# Patient Record
Sex: FEMALE | Race: ASIAN | Employment: FULL TIME | URBAN - METROPOLITAN AREA
[De-identification: names, ages, dates, MRNs, and addresses within clinical notes are randomized per-mention and may not be internally consistent; named-entity substitution may affect disease eponyms.]

---

## 2017-01-09 ENCOUNTER — OFFICE VISIT (OUTPATIENT)
Dept: INTERNAL MEDICINE CLINIC | Age: 37
End: 2017-01-09

## 2017-01-09 VITALS
HEART RATE: 70 BPM | WEIGHT: 119.2 LBS | HEIGHT: 60 IN | BODY MASS INDEX: 23.4 KG/M2 | OXYGEN SATURATION: 99 % | DIASTOLIC BLOOD PRESSURE: 60 MMHG | SYSTOLIC BLOOD PRESSURE: 100 MMHG

## 2017-01-09 DIAGNOSIS — B00.2 RECURRENT ORAL HERPES SIMPLEX: ICD-10-CM

## 2017-01-09 DIAGNOSIS — J02.9 SORE THROAT: Primary | ICD-10-CM

## 2017-01-09 PROCEDURE — 99213 OFFICE O/P EST LOW 20 MIN: CPT | Performed by: INTERNAL MEDICINE

## 2017-01-09 RX ORDER — VALACYCLOVIR HYDROCHLORIDE 1 G/1
1000 TABLET, FILM COATED ORAL 3 TIMES DAILY
Qty: 21 TABLET | Refills: 0 | Status: SHIPPED | OUTPATIENT
Start: 2017-01-09 | End: 2017-01-16

## 2017-01-09 RX ORDER — AMOXICILLIN AND CLAVULANATE POTASSIUM 875; 125 MG/1; MG/1
1 TABLET, FILM COATED ORAL 2 TIMES DAILY
Qty: 14 TABLET | Refills: 0 | Status: SHIPPED | OUTPATIENT
Start: 2017-01-09 | End: 2017-01-16

## 2017-01-09 ASSESSMENT — ENCOUNTER SYMPTOMS
CONSTIPATION: 0
ABDOMINAL PAIN: 1
BLURRED VISION: 1
PHOTOPHOBIA: 0
DOUBLE VISION: 1
HEMOPTYSIS: 0
NAUSEA: 0
COUGH: 1
STRIDOR: 0
SORE THROAT: 0
DIARRHEA: 0
EYE DISCHARGE: 0
WHEEZING: 0
SHORTNESS OF BREATH: 1
HEARTBURN: 0
VOMITING: 0
BLOOD IN STOOL: 0
EYE PAIN: 0
ORTHOPNEA: 0
BACK PAIN: 0
SPUTUM PRODUCTION: 0
EYE REDNESS: 0

## 2017-05-15 ENCOUNTER — OFFICE VISIT (OUTPATIENT)
Dept: INTERNAL MEDICINE CLINIC | Age: 37
End: 2017-05-15

## 2017-05-15 VITALS
HEIGHT: 60 IN | HEART RATE: 61 BPM | BODY MASS INDEX: 23.99 KG/M2 | SYSTOLIC BLOOD PRESSURE: 100 MMHG | DIASTOLIC BLOOD PRESSURE: 60 MMHG | OXYGEN SATURATION: 98 % | WEIGHT: 122.2 LBS

## 2017-05-15 DIAGNOSIS — G43.409 HEMIPLEGIC MIGRAINE WITHOUT STATUS MIGRAINOSUS, NOT INTRACTABLE: ICD-10-CM

## 2017-05-15 DIAGNOSIS — M62.830 SPASM OF THORACIC BACK MUSCLE: ICD-10-CM

## 2017-05-15 DIAGNOSIS — M62.830 SPASM OF THORACIC BACK MUSCLE: Primary | ICD-10-CM

## 2017-05-15 DIAGNOSIS — B00.2 RECURRENT ORAL HERPES SIMPLEX: ICD-10-CM

## 2017-05-15 DIAGNOSIS — R10.13 EPIGASTRIC PAIN: ICD-10-CM

## 2017-05-15 DIAGNOSIS — D64.9 ANEMIA, UNSPECIFIED TYPE: ICD-10-CM

## 2017-05-15 DIAGNOSIS — E55.9 VITAMIN D DEFICIENCY: Primary | ICD-10-CM

## 2017-05-15 LAB
A/G RATIO: 2.4 (ref 1.1–2.2)
ALBUMIN SERPL-MCNC: 4.6 G/DL (ref 3.4–5)
ALP BLD-CCNC: 38 U/L (ref 40–129)
ALT SERPL-CCNC: 21 U/L (ref 10–40)
ANION GAP SERPL CALCULATED.3IONS-SCNC: 15 MMOL/L (ref 3–16)
AST SERPL-CCNC: 18 U/L (ref 15–37)
BASOPHILS ABSOLUTE: 0.1 K/UL (ref 0–0.2)
BASOPHILS RELATIVE PERCENT: 0.7 %
BILIRUB SERPL-MCNC: 0.8 MG/DL (ref 0–1)
BUN BLDV-MCNC: 14 MG/DL (ref 7–20)
CALCIUM SERPL-MCNC: 8.8 MG/DL (ref 8.3–10.6)
CHLORIDE BLD-SCNC: 104 MMOL/L (ref 99–110)
CO2: 23 MMOL/L (ref 21–32)
CREAT SERPL-MCNC: <0.5 MG/DL (ref 0.6–1.1)
EOSINOPHILS ABSOLUTE: 0.1 K/UL (ref 0–0.6)
EOSINOPHILS RELATIVE PERCENT: 1.2 %
FOLATE: 6.84 NG/ML (ref 4.78–24.2)
GFR AFRICAN AMERICAN: >60
GFR NON-AFRICAN AMERICAN: >60
GLOBULIN: 1.9 G/DL
GLUCOSE BLD-MCNC: 88 MG/DL (ref 70–99)
HCT VFR BLD CALC: 36.8 % (ref 36–48)
HEMOGLOBIN: 11.2 G/DL (ref 12–16)
LYMPHOCYTES ABSOLUTE: 1.7 K/UL (ref 1–5.1)
LYMPHOCYTES RELATIVE PERCENT: 22 %
MCH RBC QN AUTO: 18.6 PG (ref 26–34)
MCHC RBC AUTO-ENTMCNC: 30.3 G/DL (ref 31–36)
MCV RBC AUTO: 61.4 FL (ref 80–100)
MICROCYTES: ABNORMAL
MONOCYTES ABSOLUTE: 0.3 K/UL (ref 0–1.3)
MONOCYTES RELATIVE PERCENT: 4.4 %
NEUTROPHILS ABSOLUTE: 5.7 K/UL (ref 1.7–7.7)
NEUTROPHILS RELATIVE PERCENT: 71.7 %
OVALOCYTES: ABNORMAL
PDW BLD-RTO: 16.5 % (ref 12.4–15.4)
PLATELET # BLD: 291 K/UL (ref 135–450)
PLATELET SLIDE REVIEW: ADEQUATE
PMV BLD AUTO: 12 FL (ref 5–10.5)
POTASSIUM SERPL-SCNC: 4.4 MMOL/L (ref 3.5–5.1)
RBC # BLD: 6.01 M/UL (ref 4–5.2)
SODIUM BLD-SCNC: 142 MMOL/L (ref 136–145)
TEAR DROP CELLS: ABNORMAL
TOTAL CK: 115 U/L (ref 26–192)
TOTAL PROTEIN: 6.5 G/DL (ref 6.4–8.2)
VITAMIN B-12: 530 PG/ML (ref 211–911)
VITAMIN D 25-HYDROXY: 14.9 NG/ML
WBC # BLD: 7.9 K/UL (ref 4–11)

## 2017-05-15 PROCEDURE — 99214 OFFICE O/P EST MOD 30 MIN: CPT | Performed by: INTERNAL MEDICINE

## 2017-05-15 RX ORDER — TIZANIDINE 2 MG/1
2 TABLET ORAL EVERY 6 HOURS PRN
Qty: 60 TABLET | Refills: 3 | Status: SHIPPED | OUTPATIENT
Start: 2017-05-15 | End: 2017-08-25

## 2017-05-15 RX ORDER — RANITIDINE 150 MG/1
150 CAPSULE ORAL 2 TIMES DAILY
Qty: 60 CAPSULE | Refills: 3 | Status: SHIPPED | OUTPATIENT
Start: 2017-05-15 | End: 2017-10-21 | Stop reason: SDUPTHER

## 2017-05-15 ASSESSMENT — ENCOUNTER SYMPTOMS
ABDOMINAL PAIN: 0
WHEEZING: 0
BACK PAIN: 1
DOUBLE VISION: 0
EYE PAIN: 0
SPUTUM PRODUCTION: 0
BLURRED VISION: 0
ORTHOPNEA: 0
EYE DISCHARGE: 0
DIARRHEA: 0
EYE REDNESS: 0
HEARTBURN: 0
PHOTOPHOBIA: 0
COUGH: 0
CONSTIPATION: 0
SHORTNESS OF BREATH: 0
STRIDOR: 0
HEMOPTYSIS: 0
SORE THROAT: 0
VOMITING: 0
BLOOD IN STOOL: 0
NAUSEA: 0

## 2017-05-16 LAB
ANA INTERPRETATION: NORMAL
ANTI-NUCLEAR ANTIBODY (ANA): NEGATIVE
ESTIMATED AVERAGE GLUCOSE: 102.5 MG/DL
HBA1C MFR BLD: 5.2 %

## 2017-05-17 PROBLEM — E55.9 VITAMIN D DEFICIENCY: Status: ACTIVE | Noted: 2017-05-17

## 2017-05-17 LAB — H PYLORI BREATH TEST: NEGATIVE

## 2017-05-22 ENCOUNTER — HOSPITAL ENCOUNTER (OUTPATIENT)
Dept: MRI IMAGING | Age: 37
Discharge: HOME | End: 2017-05-22
Attending: INTERNAL MEDICINE | Admitting: INTERNAL MEDICINE

## 2017-05-22 DIAGNOSIS — R10.13 EPIGASTRIC PAIN: ICD-10-CM

## 2017-05-22 DIAGNOSIS — M62.830 MUSCLE SPASM OF BACK: ICD-10-CM

## 2017-05-22 DIAGNOSIS — B00.2 RECURRENT ORAL HERPES SIMPLEX: ICD-10-CM

## 2017-05-22 DIAGNOSIS — M62.830 SPASM OF THORACIC BACK MUSCLE: ICD-10-CM

## 2017-06-27 PROBLEM — G43.019 INTRACTABLE MIGRAINE WITHOUT AURA AND WITHOUT STATUS MIGRAINOSUS: Status: ACTIVE | Noted: 2017-06-27

## 2017-07-07 ENCOUNTER — HOSPITAL ENCOUNTER (OUTPATIENT)
Dept: MRI IMAGING | Age: 37
Discharge: HOME | End: 2017-07-07
Attending: PSYCHIATRY & NEUROLOGY | Admitting: PSYCHIATRY & NEUROLOGY

## 2017-07-07 DIAGNOSIS — G43.019 INTRACTABLE MIGRAINE WITHOUT AURA AND WITHOUT STATUS MIGRAINOSUS: ICD-10-CM

## 2017-08-25 ENCOUNTER — OFFICE VISIT (OUTPATIENT)
Dept: NEUROLOGY | Age: 37
End: 2017-08-25

## 2017-08-25 VITALS
SYSTOLIC BLOOD PRESSURE: 106 MMHG | BODY MASS INDEX: 23.75 KG/M2 | HEART RATE: 63 BPM | HEIGHT: 60 IN | WEIGHT: 121 LBS | OXYGEN SATURATION: 98 % | DIASTOLIC BLOOD PRESSURE: 64 MMHG

## 2017-08-25 DIAGNOSIS — M62.830 SPASM OF THORACIC BACK MUSCLE: ICD-10-CM

## 2017-08-25 DIAGNOSIS — E55.9 VITAMIN D DEFICIENCY: ICD-10-CM

## 2017-08-25 DIAGNOSIS — G43.019 INTRACTABLE MIGRAINE WITHOUT AURA AND WITHOUT STATUS MIGRAINOSUS: Primary | ICD-10-CM

## 2017-08-25 PROCEDURE — 99213 OFFICE O/P EST LOW 20 MIN: CPT | Performed by: PSYCHIATRY & NEUROLOGY

## 2017-08-25 RX ORDER — CHLORZOXAZONE 500 MG/1
500 TABLET ORAL 4 TIMES DAILY PRN
Qty: 120 TABLET | Refills: 11 | Status: SHIPPED | OUTPATIENT
Start: 2017-08-25 | End: 2017-08-25 | Stop reason: SDUPTHER

## 2017-08-25 RX ORDER — CHLORZOXAZONE 500 MG/1
500 TABLET ORAL 4 TIMES DAILY PRN
Qty: 120 TABLET | Refills: 11 | Status: SHIPPED | OUTPATIENT
Start: 2017-08-25 | End: 2018-02-19 | Stop reason: SDUPTHER

## 2017-09-22 DIAGNOSIS — G43.019 INTRACTABLE MIGRAINE WITHOUT AURA AND WITHOUT STATUS MIGRAINOSUS: ICD-10-CM

## 2017-09-22 DIAGNOSIS — M62.830 SPASM OF THORACIC BACK MUSCLE: ICD-10-CM

## 2017-10-21 DIAGNOSIS — R10.13 EPIGASTRIC PAIN: ICD-10-CM

## 2017-10-23 ENCOUNTER — OFFICE VISIT (OUTPATIENT)
Dept: INTERNAL MEDICINE CLINIC | Age: 37
End: 2017-10-23

## 2017-10-23 VITALS
WEIGHT: 121 LBS | HEART RATE: 61 BPM | DIASTOLIC BLOOD PRESSURE: 66 MMHG | OXYGEN SATURATION: 98 % | BODY MASS INDEX: 23.63 KG/M2 | SYSTOLIC BLOOD PRESSURE: 102 MMHG

## 2017-10-23 DIAGNOSIS — L29.9 PRURITUS: Primary | ICD-10-CM

## 2017-10-23 DIAGNOSIS — Z23 NEED FOR INFLUENZA VACCINATION: ICD-10-CM

## 2017-10-23 DIAGNOSIS — G43.409 HEMIPLEGIC MIGRAINE WITHOUT STATUS MIGRAINOSUS, NOT INTRACTABLE: ICD-10-CM

## 2017-10-23 DIAGNOSIS — K21.9 GASTROESOPHAGEAL REFLUX DISEASE WITHOUT ESOPHAGITIS: ICD-10-CM

## 2017-10-23 PROCEDURE — 90471 IMMUNIZATION ADMIN: CPT | Performed by: INTERNAL MEDICINE

## 2017-10-23 PROCEDURE — 90686 IIV4 VACC NO PRSV 0.5 ML IM: CPT | Performed by: INTERNAL MEDICINE

## 2017-10-23 PROCEDURE — 99213 OFFICE O/P EST LOW 20 MIN: CPT | Performed by: INTERNAL MEDICINE

## 2017-10-23 RX ORDER — TRIAMCINOLONE ACETONIDE OINTMENT USP, 0.05% 0.5 MG/G
OINTMENT TOPICAL 2 TIMES DAILY PRN
Qty: 17 G | Refills: 2 | Status: SHIPPED | OUTPATIENT
Start: 2017-10-23 | End: 2019-12-30

## 2017-10-23 RX ORDER — RANITIDINE 150 MG/1
CAPSULE ORAL
Qty: 60 CAPSULE | Refills: 0 | Status: SHIPPED | OUTPATIENT
Start: 2017-10-23 | End: 2017-10-23

## 2017-10-23 RX ORDER — RANITIDINE 150 MG/1
150 TABLET ORAL 2 TIMES DAILY
Qty: 60 TABLET | Refills: 3 | Status: SHIPPED | OUTPATIENT
Start: 2017-10-23 | End: 2018-02-19 | Stop reason: SDUPTHER

## 2017-10-23 ASSESSMENT — ENCOUNTER SYMPTOMS
TROUBLE SWALLOWING: 0
DIARRHEA: 0
ABDOMINAL PAIN: 0
SHORTNESS OF BREATH: 0
VOMITING: 0
WHEEZING: 0
SORE THROAT: 0
NAUSEA: 0

## 2017-10-23 NOTE — PROGRESS NOTES
Vaccine Information Sheet, \"Influenza - Inactivated\"  given to Emmalene Perdomo, or parent/legal guardian of  Emmalene Perdomo and verbalized understanding. Patient responses:    Have you ever had a reaction to a flu vaccine? No  Are you able to eat eggs without adverse effects? Yes  Do you have any current illness? No  Have you ever had Guillian Lovingston Syndrome? No    Flu vaccine given per order. Please see immunization tab.

## 2017-10-23 NOTE — PROGRESS NOTES
History:   Procedure Laterality Date    CARPAL TUNNEL RELEASE  2010     SECTION  ,,       Orders Only on 05/15/2017   Component Date Value Ref Range Status    WBC 05/15/2017 7.9  4.0 - 11.0 K/uL Final    RBC 05/15/2017 6.01* 4.00 - 5.20 M/uL Final    Hemoglobin 05/15/2017 11.2* 12.0 - 16.0 g/dL Final    Hematocrit 05/15/2017 36.8  36.0 - 48.0 % Final    MCV 05/15/2017 61.4* 80.0 - 100.0 fL Final    MCH 05/15/2017 18.6* 26.0 - 34.0 pg Final    MCHC 05/15/2017 30.3* 31.0 - 36.0 g/dL Final    RDW 05/15/2017 16.5* 12.4 - 15.4 % Final    Platelets  291  135 - 450 K/uL Final    MPV 05/15/2017 12.0* 5.0 - 10.5 fL Final    PLATELET SLIDE REVIEW 05/15/2017 Adequate   Final    Neutrophils % 05/15/2017 71.7  % Final    Lymphocytes % 05/15/2017 22.0  % Final    Monocytes % 05/15/2017 4.4  % Final    Eosinophils % 05/15/2017 1.2  % Final    Basophils % 05/15/2017 0.7  % Final    Neutrophils # 05/15/2017 5.7  1.7 - 7.7 K/uL Final    Lymphocytes # 05/15/2017 1.7  1.0 - 5.1 K/uL Final    Monocytes # 05/15/2017 0.3  0.0 - 1.3 K/uL Final    Eosinophils # 05/15/2017 0.1  0.0 - 0.6 K/uL Final    Basophils # 05/15/2017 0.1  0.0 - 0.2 K/uL Final    Microcytes 05/15/2017 1+*  Final    Ovalocytes 05/15/2017 Occasional*  Final    Tear Drop Cells 05/15/2017 Occasional*  Final    Sodium 05/15/2017 142  136 - 145 mmol/L Final    Potassium 05/15/2017 4.4  3.5 - 5.1 mmol/L Final    Chloride 05/15/2017 104  99 - 110 mmol/L Final    CO2 05/15/2017 23  21 - 32 mmol/L Final    Anion Gap 05/15/2017 15  3 - 16 Final    Glucose 05/15/2017 88  70 - 99 mg/dL Final    BUN 05/15/2017 14  7 - 20 mg/dL Final    CREATININE 05/15/2017 <0.5* 0.6 - 1.1 mg/dL Final    GFR Non- 05/15/2017 >60  >60 Final    Comment: >60 mL/min/1.73m2 EGFR, calc. for ages 25 and older using the  MDRD formula (not corrected for weight), is valid for stable  renal function.       GFR  Take 1 tablet by mouth 2 times daily 60 tablet 3    omeprazole (PRILOSEC) 40 MG capsule   5    acetaminophen (TYLENOL) 325 MG tablet Take 650 mg by mouth every 6 hours as needed for Pain      isometheptene-acetaminophen-dichloralphenazone (MIDRIN) -100 MG per capsule Take 1 capsule by mouth every 4 hours as needed for Migraine (Headache) 120 capsule 3    Diclofenac Sodium POWD Apply 1-2 g topically as needed (TID - QID) Formula 3 D Ketamine 5 % 240 g 3     No current facility-administered medications for this visit. No Known Allergies    Review of Systems   Constitutional: Negative for chills, fatigue and fever. HENT: Negative for ear pain, sore throat, tinnitus and trouble swallowing. Eyes: Negative for visual disturbance. Respiratory: Negative for shortness of breath and wheezing. Cardiovascular: Negative for chest pain and palpitations. Gastrointestinal: Negative for abdominal pain, diarrhea, nausea and vomiting. Endocrine: Negative for cold intolerance and heat intolerance. Genitourinary: Negative for difficulty urinating and dysuria. Skin: Negative for rash. Pruritic glans of [place]. Bilateral   Neurological: Negative for dizziness, weakness and numbness. Psychiatric/Behavioral: Negative for agitation, decreased concentration and suicidal ideas. The patient is not nervous/anxious. All other systems reviewed and are negative. Vitals:  /66 (Site: Right Arm, Cuff Size: Medium Adult)   Pulse 61   Wt 121 lb (54.9 kg)   SpO2 98%   BMI 23.63 kg/m²     Physical Exam   Constitutional: She is oriented to person, place, and time. She appears well-developed and well-nourished. No distress. HENT:   Head: Normocephalic and atraumatic. Right Ear: Hearing, tympanic membrane and external ear normal.   Left Ear: Hearing, tympanic membrane and external ear normal.   Eyes: Conjunctivae and lids are normal. Pupils are equal, round, and reactive to light.  No scleral icterus. Neck: Trachea normal and normal range of motion. No hepatojugular reflux and no JVD present. Carotid bruit is not present. No thyromegaly present. Cardiovascular: Normal rate, regular rhythm, normal heart sounds and intact distal pulses. Exam reveals no friction rub. No murmur heard. Pulmonary/Chest: Effort normal and breath sounds normal. No respiratory distress. Abdominal: Soft. Normal appearance and bowel sounds are normal. She exhibits no distension. There is no tenderness. Musculoskeletal: Normal range of motion. She exhibits no edema. Arms:  Lymphadenopathy:     She has no cervical adenopathy. Neurological: She is alert and oriented to person, place, and time. She has normal strength and normal reflexes. No cranial nerve deficit or sensory deficit. Skin: Skin is warm and dry. No rash noted. She is not diaphoretic. No cyanosis. Nails show no clubbing. Psychiatric: She has a normal mood and affect. Her speech is normal and behavior is normal.       Assessment/Plan     1. Pruritus  - triamcinolone (KENALOG) 0.05 % OINT ointment; Apply topically 2 times daily as needed (itch)  Dispense: 17 g; Refill: 2    2. Hemiplegic migraine without status migrainosus, not intractable  - Isometheptene-Dichloral-APAP (MIDRIN) -325 MG CAPS; Take 1 capsule by mouth every 4 hours as needed (headache)  Dispense: 120 capsule; Refill: 3    3. Need for influenza vaccination  - INFLUENZA, QUADV, 3 YRS AND OLDER, IM, PF, PREFILL SYR OR SDV, 0.5ML (FLUZONE QUADV, PF)    4. Gastroesophageal reflux disease without esophagitis  - ranitidine (ZANTAC) 150 MG tablet; Take 1 tablet by mouth 2 times daily  Dispense: 60 tablet; Refill: 3      Orders Placed This Encounter   Procedures    INFLUENZA, QUADV, 3 YRS AND OLDER, IM, PF, PREFILL SYR OR SDV, 0.5ML (FLUZONE QUADV, PF)       Return in about 2 weeks (around 11/6/2017).     Meg Murphy MD     10/23/2017  12:51 PM    Documentation was done using

## 2018-02-19 ENCOUNTER — OFFICE VISIT (OUTPATIENT)
Dept: INTERNAL MEDICINE CLINIC | Age: 38
End: 2018-02-19

## 2018-02-19 VITALS
OXYGEN SATURATION: 99 % | BODY MASS INDEX: 23.95 KG/M2 | RESPIRATION RATE: 16 BRPM | HEIGHT: 60 IN | SYSTOLIC BLOOD PRESSURE: 108 MMHG | WEIGHT: 122 LBS | HEART RATE: 62 BPM | DIASTOLIC BLOOD PRESSURE: 68 MMHG | TEMPERATURE: 98.2 F

## 2018-02-19 DIAGNOSIS — G43.019 INTRACTABLE MIGRAINE WITHOUT AURA AND WITHOUT STATUS MIGRAINOSUS: ICD-10-CM

## 2018-02-19 DIAGNOSIS — K21.9 GASTROESOPHAGEAL REFLUX DISEASE WITHOUT ESOPHAGITIS: ICD-10-CM

## 2018-02-19 DIAGNOSIS — M19.90 ARTHRITIS: Primary | ICD-10-CM

## 2018-02-19 DIAGNOSIS — B00.2 RECURRENT ORAL HERPES SIMPLEX: ICD-10-CM

## 2018-02-19 DIAGNOSIS — G43.409 HEMIPLEGIC MIGRAINE WITHOUT STATUS MIGRAINOSUS, NOT INTRACTABLE: ICD-10-CM

## 2018-02-19 DIAGNOSIS — E55.9 VITAMIN D DEFICIENCY: ICD-10-CM

## 2018-02-19 DIAGNOSIS — M62.830 SPASM OF THORACIC BACK MUSCLE: ICD-10-CM

## 2018-02-19 LAB
A/G RATIO: 2.3 (ref 1.1–2.2)
ALBUMIN SERPL-MCNC: 4.8 G/DL (ref 3.4–5)
ALP BLD-CCNC: 39 U/L (ref 40–129)
ALT SERPL-CCNC: 17 U/L (ref 10–40)
ANION GAP SERPL CALCULATED.3IONS-SCNC: 13 MMOL/L (ref 3–16)
AST SERPL-CCNC: 15 U/L (ref 15–37)
BASOPHILS ABSOLUTE: 0.1 K/UL (ref 0–0.2)
BASOPHILS RELATIVE PERCENT: 0.6 %
BILIRUB SERPL-MCNC: 0.9 MG/DL (ref 0–1)
BUN BLDV-MCNC: 14 MG/DL (ref 7–20)
CALCIUM SERPL-MCNC: 9.2 MG/DL (ref 8.3–10.6)
CHLORIDE BLD-SCNC: 103 MMOL/L (ref 99–110)
CHOLESTEROL, TOTAL: 142 MG/DL (ref 0–199)
CO2: 25 MMOL/L (ref 21–32)
CREAT SERPL-MCNC: <0.5 MG/DL (ref 0.6–1.1)
EOSINOPHILS ABSOLUTE: 0.3 K/UL (ref 0–0.6)
EOSINOPHILS RELATIVE PERCENT: 3.3 %
GFR AFRICAN AMERICAN: >60
GFR NON-AFRICAN AMERICAN: >60
GLOBULIN: 2.1 G/DL
GLUCOSE BLD-MCNC: 80 MG/DL (ref 70–99)
HCT VFR BLD CALC: 34.3 % (ref 36–48)
HDLC SERPL-MCNC: 39 MG/DL (ref 40–60)
HEMATOLOGY PATH CONSULT: NO
HEMOGLOBIN: 11.1 G/DL (ref 12–16)
LDL CHOLESTEROL CALCULATED: 89 MG/DL
LYMPHOCYTES ABSOLUTE: 1.9 K/UL (ref 1–5.1)
LYMPHOCYTES RELATIVE PERCENT: 20.6 %
MAGNESIUM: 2.2 MG/DL (ref 1.8–2.4)
MCH RBC QN AUTO: 19.5 PG (ref 26–34)
MCHC RBC AUTO-ENTMCNC: 32.5 G/DL (ref 31–36)
MCV RBC AUTO: 59.9 FL (ref 80–100)
MONOCYTES ABSOLUTE: 0.4 K/UL (ref 0–1.3)
MONOCYTES RELATIVE PERCENT: 4.8 %
NEUTROPHILS ABSOLUTE: 6.4 K/UL (ref 1.7–7.7)
NEUTROPHILS RELATIVE PERCENT: 70.7 %
PDW BLD-RTO: 15.7 % (ref 12.4–15.4)
PLATELET # BLD: 267 K/UL (ref 135–450)
PMV BLD AUTO: 11.8 FL (ref 5–10.5)
POTASSIUM SERPL-SCNC: 4 MMOL/L (ref 3.5–5.1)
RBC # BLD: 5.73 M/UL (ref 4–5.2)
SODIUM BLD-SCNC: 141 MMOL/L (ref 136–145)
TOTAL PROTEIN: 6.9 G/DL (ref 6.4–8.2)
TRIGL SERPL-MCNC: 72 MG/DL (ref 0–150)
VLDLC SERPL CALC-MCNC: 14 MG/DL
WBC # BLD: 9.1 K/UL (ref 4–11)

## 2018-02-19 PROCEDURE — 99214 OFFICE O/P EST MOD 30 MIN: CPT | Performed by: INTERNAL MEDICINE

## 2018-02-19 RX ORDER — MULTIVIT-MIN/IRON/FOLIC/HRB186 3.3 MG-25
1 TABLET ORAL DAILY
Qty: 30 CAPSULE | Refills: 11 | Status: SHIPPED | OUTPATIENT
Start: 2018-02-19 | End: 2019-01-07

## 2018-02-19 RX ORDER — ACYCLOVIR 400 MG/1
400 TABLET ORAL 2 TIMES DAILY
Qty: 60 TABLET | Refills: 3 | Status: SHIPPED | OUTPATIENT
Start: 2018-02-19 | End: 2018-09-03 | Stop reason: SDUPTHER

## 2018-02-19 RX ORDER — CHLORZOXAZONE 500 MG/1
500 TABLET ORAL 4 TIMES DAILY PRN
Qty: 120 TABLET | Refills: 11 | Status: SHIPPED | OUTPATIENT
Start: 2018-02-19 | End: 2019-01-07 | Stop reason: SDUPTHER

## 2018-02-19 RX ORDER — RANITIDINE 150 MG/1
150 TABLET ORAL 2 TIMES DAILY
Qty: 60 TABLET | Refills: 3 | Status: SHIPPED | OUTPATIENT
Start: 2018-02-19 | End: 2019-01-07 | Stop reason: ALTCHOICE

## 2018-02-19 ASSESSMENT — ENCOUNTER SYMPTOMS
VOMITING: 0
HEMOPTYSIS: 0
WHEEZING: 0
HEARTBURN: 0
STRIDOR: 0
EYE REDNESS: 0
DIARRHEA: 0
ABDOMINAL PAIN: 0
ORTHOPNEA: 0
SORE THROAT: 0
CONSTIPATION: 0
EYE DISCHARGE: 0
PHOTOPHOBIA: 0
NAUSEA: 0
SPUTUM PRODUCTION: 0
BLOOD IN STOOL: 0
DOUBLE VISION: 0
BLURRED VISION: 0
SHORTNESS OF BREATH: 0
COUGH: 0
BACK PAIN: 1
EYE PAIN: 0

## 2018-02-19 NOTE — PATIENT INSTRUCTIONS
temperature away from moisture and heat. What happens if I miss a dose? Skip the missed dose if it is almost time for your next scheduled dose. Do not use extra chondroitin and glucosamine to make up the missed dose. What happens if I overdose? Seek emergency medical attention or call the Poison Help line at 1-329.573.5331. What should I avoid while taking chondroitin and glucosamine? Follow your healthcare provider's instructions about any restrictions on food, beverages, or activity. What are the possible side effects of chondroitin and glucosamine? Get emergency medical help if you have signs of an allergic reaction: hives; difficult breathing; swelling of your face, lips, tongue, or throat. Although not all side effects are known, chondroitin and glucosamine is thought to be possibly safe when taken for up to 2 years. Stop using chondroitin and glucosamine and call your healthcare provider at once if you have:  · irregular heartbeats; or  · swelling in your legs. Common side effects may include:  · nausea, diarrhea, constipation;  · stomach pain, gas, bloating;  · hair loss; or  · puffy eyelids. This is not a complete list of side effects and others may occur. Call your doctor for medical advice about side effects. You may report side effects to FDA at 5-708-AFA-6200. What other drugs will affect chondroitin and glucosamine? Do not take chondroitin and glucosamine without medical advice if you are using any of the following medications:  · cancer medicine (chemotherapy). This list is not complete. Other drugs may interact with chondroitin and glucosamine, including prescription and over-the-counter medicines, vitamins, and herbal products. Not all possible interactions are listed in this product guide. Where can I get more information? Consult with a licensed healthcare professional before using any herbal/health supplement.  Whether you are treated by a medical doctor or a practitioner trained questions about a medical condition or this instruction, always ask your healthcare professional. [place] [address] any warranty or liability for your use of this information.

## 2018-02-19 NOTE — PROGRESS NOTES
and urgency. Musculoskeletal: Positive for back pain. Negative for falls, joint pain, myalgias and neck pain. Skin: Negative. Negative for itching and rash. Neurological: Positive for weakness. Negative for dizziness, tingling, tremors, sensory change, speech change, focal weakness, seizures, loss of consciousness and headaches. Endo/Heme/Allergies: Negative. Psychiatric/Behavioral: Negative. Negative for depression, hallucinations, memory loss, substance abuse and suicidal ideas. The patient is not nervous/anxious and does not have insomnia. All other systems reviewed and are negative. Objective: Wt Readings from Last 3 Encounters:   02/19/18 122 lb (55.3 kg)   10/23/17 121 lb (54.9 kg)   08/25/17 121 lb (54.9 kg)     BP Readings from Last 3 Encounters:   02/19/18 108/68   10/23/17 102/66   08/25/17 106/64      Vitals:    02/19/18 1417   BP: 108/68   Site: Left Arm   Position: Sitting   Cuff Size: Medium Adult   Pulse: 62   Resp: 16   Temp: 98.2 °F (36.8 °C)   TempSrc: Oral   SpO2: 99%   Weight: 122 lb (55.3 kg)   Height: 5' (1.524 m)     Body mass index is 23.83 kg/m². Physical Exam   Constitutional: She is oriented to person, place, and time and well-developed, well-nourished, and in no distress. Vital signs are normal. She appears not lethargic, to not be writhing in pain, not malnourished, not dehydrated and not jaundiced. She appears healthy. She appears not cachectic. Non-toxic appearance. She does not have a sickly appearance. No distress. HENT:   Head: Normocephalic and atraumatic. Right Ear: Hearing, tympanic membrane, external ear and ear canal normal.   Left Ear: Hearing, tympanic membrane, external ear and ear canal normal.   Nose: Nose normal.   Mouth/Throat: Oropharynx is clear and moist. No oropharyngeal exudate. Eyes: Right eye visual fields normal and left eye visual fields normal. Conjunctivae and EOM are normal. Pupils are equal, round, and reactive to light.

## 2018-02-20 LAB
TSH SERPL DL<=0.05 MIU/L-ACNC: 0.87 UIU/ML (ref 0.27–4.2)
VITAMIN D 25-HYDROXY: 44.2 NG/ML

## 2018-03-05 ENCOUNTER — HOSPITAL ENCOUNTER (OUTPATIENT)
Dept: WOMENS IMAGING | Age: 38
Discharge: HOME | End: 2018-03-05
Attending: INTERNAL MEDICINE | Admitting: INTERNAL MEDICINE

## 2018-03-05 DIAGNOSIS — Z12.31 VISIT FOR SCREENING MAMMOGRAM: ICD-10-CM

## 2018-06-04 ENCOUNTER — OFFICE VISIT (OUTPATIENT)
Dept: INTERNAL MEDICINE CLINIC | Age: 38
End: 2018-06-04

## 2018-06-04 VITALS
HEART RATE: 74 BPM | HEIGHT: 60 IN | BODY MASS INDEX: 23.75 KG/M2 | DIASTOLIC BLOOD PRESSURE: 62 MMHG | OXYGEN SATURATION: 99 % | SYSTOLIC BLOOD PRESSURE: 102 MMHG | WEIGHT: 121 LBS

## 2018-06-04 DIAGNOSIS — Z71.84 TRAVEL ADVICE ENCOUNTER: ICD-10-CM

## 2018-06-04 DIAGNOSIS — G43.409 HEMIPLEGIC MIGRAINE WITHOUT STATUS MIGRAINOSUS, NOT INTRACTABLE: Primary | ICD-10-CM

## 2018-06-04 DIAGNOSIS — D64.9 ANEMIA, UNSPECIFIED TYPE: ICD-10-CM

## 2018-06-04 DIAGNOSIS — E55.9 VITAMIN D DEFICIENCY: ICD-10-CM

## 2018-06-04 DIAGNOSIS — R10.13 EPIGASTRIC PAIN: ICD-10-CM

## 2018-06-04 PROCEDURE — 99214 OFFICE O/P EST MOD 30 MIN: CPT | Performed by: INTERNAL MEDICINE

## 2018-06-04 RX ORDER — OMEPRAZOLE 40 MG/1
40 CAPSULE, DELAYED RELEASE ORAL DAILY
Qty: 30 CAPSULE | Refills: 5 | Status: SHIPPED | OUTPATIENT
Start: 2018-06-04 | End: 2019-11-11 | Stop reason: SDUPTHER

## 2018-06-04 RX ORDER — CIPROFLOXACIN 250 MG/1
250 TABLET, FILM COATED ORAL 2 TIMES DAILY
Qty: 6 TABLET | Refills: 0 | Status: SHIPPED | OUTPATIENT
Start: 2018-06-04 | End: 2018-06-07

## 2018-06-04 RX ORDER — MECLIZINE HCL 12.5 MG/1
12.5 TABLET ORAL 3 TIMES DAILY PRN
Qty: 30 TABLET | Refills: 0 | Status: SHIPPED | OUTPATIENT
Start: 2018-06-04 | End: 2018-06-14

## 2018-06-04 RX ORDER — ATOVAQUONE AND PROGUANIL HYDROCHLORIDE 250; 100 MG/1; MG/1
1 TABLET, FILM COATED ORAL DAILY
Qty: 40 TABLET | Refills: 0 | Status: SHIPPED | OUTPATIENT
Start: 2018-06-04 | End: 2018-07-16

## 2018-06-04 ASSESSMENT — ENCOUNTER SYMPTOMS
SORE THROAT: 0
EYE REDNESS: 0
DOUBLE VISION: 0
NAUSEA: 0
COUGH: 0
WHEEZING: 0
STRIDOR: 0
HEARTBURN: 0
ORTHOPNEA: 0
EYE PAIN: 0
ABDOMINAL PAIN: 0
CONSTIPATION: 0
VOMITING: 0
SPUTUM PRODUCTION: 0
BLURRED VISION: 0
EYE DISCHARGE: 0
BACK PAIN: 1
SHORTNESS OF BREATH: 0
DIARRHEA: 0
HEMOPTYSIS: 0
BLOOD IN STOOL: 0
PHOTOPHOBIA: 0

## 2018-06-04 ASSESSMENT — PATIENT HEALTH QUESTIONNAIRE - PHQ9
2. FEELING DOWN, DEPRESSED OR HOPELESS: 0
SUM OF ALL RESPONSES TO PHQ9 QUESTIONS 1 & 2: 0
SUM OF ALL RESPONSES TO PHQ QUESTIONS 1-9: 0
1. LITTLE INTEREST OR PLEASURE IN DOING THINGS: 0

## 2018-06-05 ENCOUNTER — TELEPHONE (OUTPATIENT)
Dept: ORTHOPEDIC SURGERY | Age: 38
End: 2018-06-05

## 2018-07-02 DIAGNOSIS — E55.9 VITAMIN D DEFICIENCY: ICD-10-CM

## 2018-09-03 DIAGNOSIS — B00.2 RECURRENT ORAL HERPES SIMPLEX: ICD-10-CM

## 2018-09-03 DIAGNOSIS — G43.409 HEMIPLEGIC MIGRAINE WITHOUT STATUS MIGRAINOSUS, NOT INTRACTABLE: ICD-10-CM

## 2018-09-04 RX ORDER — ACYCLOVIR 400 MG/1
400 TABLET ORAL 2 TIMES DAILY
Qty: 60 TABLET | Refills: 1 | Status: SHIPPED | OUTPATIENT
Start: 2018-09-04 | End: 2018-10-19 | Stop reason: SDUPTHER

## 2019-05-07 ENCOUNTER — HOSPITAL ENCOUNTER (OUTPATIENT)
Dept: WOMENS IMAGING | Age: 39
Discharge: HOME | End: 2019-05-07
Payer: COMMERCIAL

## 2019-05-07 DIAGNOSIS — Z12.31 VISIT FOR SCREENING MAMMOGRAM: ICD-10-CM

## 2019-05-07 PROCEDURE — 77067 SCR MAMMO BI INCL CAD: CPT

## 2020-01-02 ENCOUNTER — HOSPITAL ENCOUNTER (OUTPATIENT)
Dept: ULTRASOUND IMAGING | Age: 40
Discharge: HOME | End: 2020-01-02
Payer: COMMERCIAL

## 2020-01-02 ENCOUNTER — HOSPITAL ENCOUNTER (OUTPATIENT)
Dept: WOMENS IMAGING | Age: 40
Discharge: HOME | End: 2020-01-02
Payer: COMMERCIAL

## 2020-01-02 PROCEDURE — G0279 TOMOSYNTHESIS, MAMMO: HCPCS

## 2020-01-02 PROCEDURE — 76642 ULTRASOUND BREAST LIMITED: CPT

## 2020-07-08 ENCOUNTER — HOSPITAL ENCOUNTER (OUTPATIENT)
Dept: WOMENS IMAGING | Age: 40
Discharge: HOME | End: 2020-07-08
Payer: COMMERCIAL

## 2020-08-26 ENCOUNTER — OFFICE VISIT (OUTPATIENT)
Dept: FAMILY MEDICINE CLINIC | Age: 40
End: 2020-08-26
Payer: COMMERCIAL

## 2020-08-26 VITALS
HEART RATE: 78 BPM | HEIGHT: 58 IN | RESPIRATION RATE: 14 BRPM | BODY MASS INDEX: 24.98 KG/M2 | SYSTOLIC BLOOD PRESSURE: 116 MMHG | TEMPERATURE: 97.5 F | DIASTOLIC BLOOD PRESSURE: 78 MMHG | OXYGEN SATURATION: 99 % | WEIGHT: 119 LBS

## 2020-08-26 DIAGNOSIS — D64.9 ANEMIA, UNSPECIFIED TYPE: ICD-10-CM

## 2020-08-26 DIAGNOSIS — R73.03 PREDIABETES: ICD-10-CM

## 2020-08-26 DIAGNOSIS — K21.9 GASTROESOPHAGEAL REFLUX DISEASE WITHOUT ESOPHAGITIS: ICD-10-CM

## 2020-08-26 LAB
A/G RATIO: 2.3 (ref 1.1–2.2)
ALBUMIN SERPL-MCNC: 4.8 G/DL (ref 3.4–5)
ALP BLD-CCNC: 43 U/L (ref 40–129)
ALT SERPL-CCNC: 17 U/L (ref 10–40)
ANION GAP SERPL CALCULATED.3IONS-SCNC: 9 MMOL/L (ref 3–16)
AST SERPL-CCNC: 20 U/L (ref 15–37)
BASOPHILS ABSOLUTE: 0.1 K/UL (ref 0–0.2)
BASOPHILS RELATIVE PERCENT: 0.5 %
BILIRUB SERPL-MCNC: 0.8 MG/DL (ref 0–1)
BUN BLDV-MCNC: 15 MG/DL (ref 7–20)
CALCIUM SERPL-MCNC: 9.6 MG/DL (ref 8.3–10.6)
CHLORIDE BLD-SCNC: 102 MMOL/L (ref 99–110)
CO2: 25 MMOL/L (ref 21–32)
CREAT SERPL-MCNC: <0.5 MG/DL (ref 0.6–1.1)
EOSINOPHILS ABSOLUTE: 0.2 K/UL (ref 0–0.6)
EOSINOPHILS RELATIVE PERCENT: 1.6 %
FERRITIN: 235.2 NG/ML (ref 15–150)
GFR AFRICAN AMERICAN: >60
GFR NON-AFRICAN AMERICAN: >60
GLOBULIN: 2.1 G/DL
GLUCOSE BLD-MCNC: 93 MG/DL (ref 70–99)
HCT VFR BLD CALC: 37.6 % (ref 36–48)
HEMATOLOGY PATH CONSULT: NO
HEMOGLOBIN: 11.8 G/DL (ref 12–16)
IRON SATURATION: 43 % (ref 15–50)
IRON: 126 UG/DL (ref 37–145)
LYMPHOCYTES ABSOLUTE: 1.9 K/UL (ref 1–5.1)
LYMPHOCYTES RELATIVE PERCENT: 18.9 %
MCH RBC QN AUTO: 19.4 PG (ref 26–34)
MCHC RBC AUTO-ENTMCNC: 31.5 G/DL (ref 31–36)
MCV RBC AUTO: 61.7 FL (ref 80–100)
MONOCYTES ABSOLUTE: 0.5 K/UL (ref 0–1.3)
MONOCYTES RELATIVE PERCENT: 5.1 %
NEUTROPHILS ABSOLUTE: 7.4 K/UL (ref 1.7–7.7)
NEUTROPHILS RELATIVE PERCENT: 73.9 %
PDW BLD-RTO: 16.5 % (ref 12.4–15.4)
PLATELET # BLD: 190 K/UL (ref 135–450)
PLATELET SLIDE REVIEW: ADEQUATE
PMV BLD AUTO: 9.8 FL (ref 5–10.5)
POTASSIUM SERPL-SCNC: 4.2 MMOL/L (ref 3.5–5.1)
RBC # BLD: 6.09 M/UL (ref 4–5.2)
SODIUM BLD-SCNC: 136 MMOL/L (ref 136–145)
TOTAL IRON BINDING CAPACITY: 294 UG/DL (ref 260–445)
TOTAL PROTEIN: 6.9 G/DL (ref 6.4–8.2)
WBC # BLD: 10 K/UL (ref 4–11)

## 2020-08-26 PROCEDURE — 1036F TOBACCO NON-USER: CPT | Performed by: INTERNAL MEDICINE

## 2020-08-26 PROCEDURE — 99204 OFFICE O/P NEW MOD 45 MIN: CPT | Performed by: INTERNAL MEDICINE

## 2020-08-26 PROCEDURE — G8427 DOCREV CUR MEDS BY ELIG CLIN: HCPCS | Performed by: INTERNAL MEDICINE

## 2020-08-26 PROCEDURE — G8420 CALC BMI NORM PARAMETERS: HCPCS | Performed by: INTERNAL MEDICINE

## 2020-08-26 RX ORDER — TIZANIDINE 2 MG/1
TABLET ORAL
Qty: 45 TABLET | Refills: 0 | Status: SHIPPED | OUTPATIENT
Start: 2020-08-26 | End: 2020-11-02

## 2020-08-26 RX ORDER — BUTALBITAL, ACETAMINOPHEN AND CAFFEINE 50; 325; 40 MG/1; MG/1; MG/1
1 TABLET ORAL EVERY 4 HOURS PRN
Qty: 60 TABLET | Refills: 5 | Status: CANCELLED | OUTPATIENT
Start: 2020-08-26

## 2020-08-26 RX ORDER — RANITIDINE 150 MG/1
TABLET ORAL
Qty: 60 TABLET | Refills: 3 | Status: CANCELLED | OUTPATIENT
Start: 2020-08-26

## 2020-08-26 RX ORDER — CHOLECALCIFEROL (VITAMIN D3) 1250 MCG
CAPSULE ORAL
Qty: 12 CAPSULE | Refills: 3 | Status: CANCELLED | OUTPATIENT
Start: 2020-08-26

## 2020-08-26 RX ORDER — ACYCLOVIR 400 MG/1
TABLET ORAL
Qty: 180 TABLET | Refills: 3 | Status: SHIPPED | OUTPATIENT
Start: 2020-08-26 | End: 2021-05-20

## 2020-08-26 RX ORDER — OMEPRAZOLE 20 MG/1
20 CAPSULE, DELAYED RELEASE ORAL DAILY
Qty: 90 CAPSULE | Refills: 0 | Status: SHIPPED | OUTPATIENT
Start: 2020-08-26 | End: 2021-05-20 | Stop reason: SDUPTHER

## 2020-08-26 RX ORDER — PAROXETINE 10 MG/1
5 TABLET, FILM COATED ORAL DAILY
Qty: 45 TABLET | Refills: 1 | Status: SHIPPED | OUTPATIENT
Start: 2020-08-26 | End: 2021-03-02

## 2020-08-26 ASSESSMENT — ENCOUNTER SYMPTOMS
VOMITING: 0
COLOR CHANGE: 0
WHEEZING: 0
EYE PAIN: 0
SHORTNESS OF BREATH: 0
CONSTIPATION: 0
DIARRHEA: 0
NAUSEA: 0

## 2020-08-26 NOTE — PROGRESS NOTES
8/26/2020    Chief Complaint   Patient presents with    New Patient     last pcp left the practice. needs to est new. HPI  Prior doctor dose not work anymore . Has  Taken fioricet for headaches   Takes it for a few yrs as needed. Sometimes not at all and some 2 pills a month  Saw neurologist for migraine headache. Gets a headache maybe twice a months    Depression     Works a lot of hours with stress with family /kids  Now taking medication and each week takes a day off  Works in nail salon    Was working  7 days a week , now 6 days . Runs the business    Takes zantac for heartburn. Twice seen gi specialist  Had scope and ok. Would like to have med to reduce the acid  At  6 am  Helps pain but not the acid  On the tongue      On zanaflex     For shoulder , tight  Takes daily  Sometimes daily , depends     Prediabetes     Lab Results   Component Value Date    LABA1C 5.8 11/11/2019    LABA1C 5.3 01/07/2019    LABA1C 5.2 05/15/2017     For 8 yrs has not taken iron  No period in  2 yrs  Used to take iron  Was born with anemia  [facility] [place]    Has pain everyday in the left breast . Needs repeat breast imaging        Review of Systems   Constitutional: Negative for fatigue and unexpected weight change. HENT: Negative for hearing loss and tinnitus. Eyes: Negative for pain and visual disturbance. Respiratory: Negative for shortness of breath and wheezing. Cardiovascular: Negative for chest pain, palpitations and leg swelling. Gastrointestinal: Negative for constipation, diarrhea, nausea and vomiting. Endocrine: Negative for cold intolerance and heat intolerance. Genitourinary: Negative for dysuria and frequency. Musculoskeletal: Negative for gait problem and joint swelling. Skin: Negative for color change and rash. Neurological: Positive for headaches. Negative for dizziness. Psychiatric/Behavioral: Positive for dysphoric mood. The patient is not nervous/anxious. Health Maintenance   Topic Date Due    Varicella vaccine (1 of 2 - 2-dose childhood series) 08/10/1981    Cervical cancer screen  08/10/2001    Flu vaccine (1) 2020    A1C test (Diabetic or Prediabetic)  2020    Lipid screen  2024    DTaP/Tdap/Td vaccine (2 - Td) 2028    HIV screen  Completed    Hepatitis A vaccine  Aged Out    Hepatitis B vaccine  Aged Out    Hib vaccine  Aged Out    Meningococcal (ACWY) vaccine  Aged Out    Pneumococcal 0-64 years Vaccine  Aged Out      Social History     Tobacco Use    Smoking status: Never Smoker    Smokeless tobacco: Never Used   Substance Use Topics    Alcohol use: No     Alcohol/week: 0.0 standard drinks    Drug use: No      Family History   Problem Relation Age of Onset    High Cholesterol Mother      Prior to Visit Medications    Medication Sig Taking?  Authorizing Provider   tiZANidine (ZANAFLEX) 2 MG tablet TAKE ONE TABLET BY MOUTH EVERY 8 HOURS AS NEEDED FOR PAIN Yes FRANCISCO Canales CNP   PARoxetine (PAXIL) 10 MG tablet Take 0.5 tablets by mouth daily Yes FRANCISCO Canales CNP   Cholecalciferol (VITAMIN D3) 1.25 MG (02269 UT) CAPS TAKE 1 CAPSULE BY MOUTH 1 TIME A WEEK FOR 12 WEEKS Yes Sherryle Maryland, MD   acyclovir (ZOVIRAX) 400 MG tablet TAKE 1 TABLET BY MOUTH TWICE DAILY Yes Sherryle Maryland, MD   butalbital-acetaminophen-caffeine (FIORICET, ESGIC) -40 MG per tablet TAKE 1 TABLET BY MOUTH EVERY 4 HOURS AS NEEDED FOR HEADACHES Yes Sherryle Maryland, MD   ranitidine (ZANTAC) 150 MG tablet TAKE 1 TABLET BY MOUTH TWICE DAILY Yes Sherryle Maryland, MD   etonogestrel (Nicole Roys) 68 MG implant 68 mg by Subdermal route once Yes Historical Provider, MD   acetaminophen (TYLENOL) 325 MG tablet Take 650 mg by mouth every 6 hours as needed for Pain Yes Historical Provider, MD     Patient Active Problem List   Diagnosis    Epigastric pain    Anemia    Recurrent oral herpes simplex    History of  section    Hemiplegic migraine (54.4 kg)   01/07/19 116 lb (52.6 kg)        Hilaria was seen today for new patient. Diagnoses and all orders for this visit:    Anemia, unspecified type  -     CBC Auto Differential; Future  -     Iron and TIBC; Future  -     Ferritin; Future    Upper back pain on right side  -     tiZANidine (ZANAFLEX) 2 MG tablet; TAKE ONE TABLET BY MOUTH EVERY 8 HOURS AS NEEDED FOR PAIN    Depression, unspecified depression type  -     PARoxetine (PAXIL) 10 MG tablet; Take 0.5 tablets by mouth daily    Vitamin D deficiency    Recurrent oral herpes simplex  -     acyclovir (ZOVIRAX) 400 MG tablet; TAKE 1 TABLET BY MOUTH TWICE DAILY    Intractable migraine without aura and without status migrainosus  -     Amb External Referral To Neurology    Gastroesophageal reflux disease without esophagitis  -     CBC Auto Differential; Future  -     Comprehensive Metabolic Panel; Future    Prediabetes  -     Hemoglobin A1C; Future    Breast pain, left  -     US BREAST COMPLETE LEFT; Future  -     Ambulatory referral to Breast Clinic    Other orders  -     omeprazole (PRILOSEC) 20 MG delayed release capsule;  Take 1 capsule by mouth daily    time  50 min  Used  audio  She had microscopic anemia  Has not had period in  2 yr  Will ck labs   She wanted a repeat breast US done  Has left sided breast pain  Reviewed breast imaging done about  6 months ago  I don't want to rx very many pills if any of the fioricet  She has some left over  I also went over with her not to take zanaflex too often

## 2020-08-27 ENCOUNTER — HOSPITAL ENCOUNTER (OUTPATIENT)
Dept: ULTRASOUND IMAGING | Age: 40
Discharge: HOME | End: 2020-08-27
Payer: COMMERCIAL

## 2020-08-27 DIAGNOSIS — D50.9 MICROCYTIC ANEMIA: ICD-10-CM

## 2020-08-27 LAB
ESTIMATED AVERAGE GLUCOSE: 116.9 MG/DL
HBA1C MFR BLD: 5.7 %

## 2020-08-27 PROCEDURE — 76642 ULTRASOUND BREAST LIMITED: CPT

## 2020-09-01 LAB — HEMOGLOBIN ELECTROPHORESIS: NORMAL

## 2020-09-03 PROBLEM — D56.3 BETA THALASSEMIA TRAIT: Status: ACTIVE | Noted: 2020-09-03

## 2020-09-03 LAB
HGB ELECTROPHORESIS INTERP: NORMAL
Lab: NORMAL
REPORT: NORMAL
THIS TEST SENT TO: NORMAL

## 2020-11-02 RX ORDER — TIZANIDINE 2 MG/1
TABLET ORAL
Qty: 45 TABLET | Refills: 1 | Status: SHIPPED | OUTPATIENT
Start: 2020-11-02 | End: 2021-05-20 | Stop reason: SDUPTHER

## 2021-02-28 DIAGNOSIS — F32.A DEPRESSION, UNSPECIFIED DEPRESSION TYPE: ICD-10-CM

## 2021-03-02 RX ORDER — PAROXETINE 10 MG/1
TABLET, FILM COATED ORAL
Qty: 45 TABLET | Refills: 0 | Status: SHIPPED | OUTPATIENT
Start: 2021-03-02 | End: 2021-05-20 | Stop reason: SDUPTHER

## 2021-03-23 ENCOUNTER — TELEPHONE (OUTPATIENT)
Dept: BREAST CENTER | Age: 41
End: 2021-03-23

## 2021-03-24 ENCOUNTER — OFFICE VISIT (OUTPATIENT)
Dept: BREAST CENTER | Age: 41
End: 2021-03-24
Payer: COMMERCIAL

## 2021-03-24 VITALS
HEART RATE: 76 BPM | SYSTOLIC BLOOD PRESSURE: 107 MMHG | WEIGHT: 120 LBS | TEMPERATURE: 97.9 F | HEIGHT: 60 IN | OXYGEN SATURATION: 98 % | DIASTOLIC BLOOD PRESSURE: 71 MMHG | BODY MASS INDEX: 23.56 KG/M2

## 2021-03-24 DIAGNOSIS — Z98.890 HISTORY OF BENIGN BREAST BIOPSY: ICD-10-CM

## 2021-03-24 DIAGNOSIS — Z12.39 ENCOUNTER FOR SCREENING BREAST EXAMINATION: ICD-10-CM

## 2021-03-24 DIAGNOSIS — N64.4 BREAST PAIN: Primary | ICD-10-CM

## 2021-03-24 PROCEDURE — 1036F TOBACCO NON-USER: CPT | Performed by: NURSE PRACTITIONER

## 2021-03-24 PROCEDURE — G8484 FLU IMMUNIZE NO ADMIN: HCPCS | Performed by: NURSE PRACTITIONER

## 2021-03-24 PROCEDURE — G8420 CALC BMI NORM PARAMETERS: HCPCS | Performed by: NURSE PRACTITIONER

## 2021-03-24 PROCEDURE — 99203 OFFICE O/P NEW LOW 30 MIN: CPT | Performed by: NURSE PRACTITIONER

## 2021-03-24 PROCEDURE — G8427 DOCREV CUR MEDS BY ELIG CLIN: HCPCS | Performed by: NURSE PRACTITIONER

## 2021-03-24 NOTE — PATIENT INSTRUCTIONS
Healthy Lifestyle Recommendations: healthy diet (decrease consumption of red meat, increase fresh fruits and vegetables), decreased alcohol consumption (less than 4 drinks/week), adequate sleep (goal 6-8 hours), routine exercise (goal 150 minutes/week or greater), weight control. MANAGING YOUR BREAST PAIN  Up to 7 in 10 women develop breast pain (Mastodynia) at some stage in their life. Breast pain is usually classed as either cyclic or noncyclic, depending on whether it occurs with each period (cyclic) or not. Cyclic pain where the pain is related to periods. Typically, it occurs in the second half of the monthly cycle, becoming worse in the days just before a period. Cyclic pain seems related to female hormones and periods. Pain starts when the ovary releases the egg, continues until the period begins, and stops at the end of the period. A dull ache is felt in the whole breast but more near the armpit. Cyclic breast pain is very commonly first develops between the ages of 27and 48years old. Noncyclic pain where the pain is non-related to periods. Pain coming from the breast itself -for example, infection or breast-feeding. Pain which does not come from the breast itself. Usually in a pain which is felt in the breast. Noncyclic pain occurs more in woman older than 40 and the pain is not related to your period. WHAT CAUSES BREAST PAIN? The many causes include fibrocystic breast disease; use of estrogen hormones, infection of the breast (mastitis), pregnancy, puberty, normal hormonal changes before puberty or menopause, breastfeeding, and medications, including digoxin, cimetidine, spironolactone and methyldopa. It's not contagious or passed from one generation to another. Breastfeeding          Pregnancy             Puberty        Mastitis           Medications        WHAT ARE THE SYMPTOMS OF BREAST PAIN? In many women the symptoms are mild.  Indeed, it can be considered normal to have some breast discomfort for a few days before a period. However, in some women the pain can be severe and/or last longer. The 3-5 days prior to a period are usually the worst. In a few women, the pain last up to two weeks before a period. The pain usually eases soon after a period starts. The severity usually varies from month to month. Typically, the pain affects both breast. It is usually worse in the upper and outer part of the breast and may travel to the inner part of the upper arm. HOW IS BREAST PAIN DIAGNOSED? The doctor makes a diagnosis from a medical history and breast examination. Also, the doctor may order mammography (a special x-ray examination of the breast). If the mammogram shows a lump the doctor may order an ultrasound (a test using sound waves to see whether the lump is solid or fluid-filled). Breast pain rarely means breast cancer. For most  women with breast pain unrelated to breast                          cancer, pain stops on its own. Symptoms    that interfere with normal daily activities can be    helped with pain medications such as anitinflammatory drugs such as Ibuprofen. Wear proper fitting bras-they should  not be too tight or too loose. If you are  unsure, have a bra fitting done; many   women wear the wrong size bra. Eat low-fat, low-salt foods, and whole grain instead of processed grains. Avoid foods that trigger breast pain                                      DO AND DONT'S IN MANAGING BREAST PAIN   DO remember that breast cancer is very rarely (< 10%) cause breast pain   DO eat low-fat foods, free of hydrogenated fats such as those in margarine. Eat whole grains instead of processed grains   DO call your doctor if you feel a lump in your breast.   DO call your doctor if you see a discharge from your nipple   DO call your doctor if you see irregular dimpling of the breast or nipple   DO call your doctor if you have a fever, fatigue, or nausea.    DO call your doctor if you have long-lasting breast pain   DON'T wear tight or loose fitting bras.  DONT use too much caffeine. Avoid foods that trigger breast pain. Avoid margarine, trans fats and salt  You should always do monthly self breast exams to check for lumps. Encourage the other woman in your life to do the same. We recommend if you are having persistent pain to follow the protocol listed below. It normally takes 4-6 weeks for it to go into effect. You can purchase the following at any local Health food store or in the Natural aisle at any local grocery store.        Breast Pain Protocol  Vitamin E   400 units daily

## 2021-03-24 NOTE — PROGRESS NOTES
Surgical Breast Oncology     Primary Care Provider: Munira Merino MD    CC: Breast Pain     Farnaz Clinton is being seen at the request of Munira Merino MD for a consultation for breast pain. HPI:  is a 36 y.o. woman who presents today for left lateral breast pain that has been ongoing for 5 plus years but worse over the last few months. Pain is sharp/stabbing, occurs multiple times throughout the day, last for ~5minutes, and resolves spontaneously. Nothing makes the pain better or worse. Javi Puga Her bra is minimally supportive. She states that she does perform routine self breast evaluations and has not noticed any new abnormalities such as masses, skin changes, color changes, nipple discharge, or changes to the nipple-areolar complex. Cigarette smmoking: non-smoker  Trauma to the breast, neck or shoulder: denies  Chronic neck/shoulder/back pain: denies   Recent changes to menstrual cycle or hormone therapy: denies  Is pain related to menstrual cycle: denies   Bra size:     Previously followed at [facility] for longstanding history of bialteral braest pain. She also has a history of right breast biopsy 3/2012 at [facility] fibroadenoma on pathology. She has no family history of breast or ovarian cancer.     INTERVAL HISTORY:  Bilateral diagnostic mammogram and left breast U/S 1/2/2020:  No suspicious interval change is appreciated mammographically in either  breast.     Targeted ultrasound of the upper-outer left breast demonstrated an [place] of  tissue isoechoic to parenchymal breast tissue in echogenic island of tissue 1  o'clock axis 4 cm from the nipple.  This measures 7 mm and demonstrates  benign features.  This could represent an [place] of breast tissue versus a  fibroadenoma and is considered probably benign.     Recommend six-month follow-up left breast ultrasound to document stability of  this finding at the 1 o'clock axis.  Assuming there is no change in the  ultrasound appearance in 6 months time, the patient can resume annual  mammographic screening in 2021. BI-RADS3. Left breast U/S 2020:  No change in the slightly hypoechoic area which  appears to blend in with the surrounding tissue favoring fibroglandular  tissue.  There is no discrete mass or fluid collection. BI-RADS2. Review of Systems    Past Medical History:   Diagnosis Date    Headache        Past Surgical History:   Procedure Laterality Date    CARPAL TUNNEL RELEASE  2010     SECTION  ,,2012       Allergies as of 2021    (No Known Allergies)       Social History     Tobacco Use    Smoking status: Never Smoker    Smokeless tobacco: Never Used   Substance Use Topics    Alcohol use: No     Alcohol/week: 0.0 standard drinks    Drug use: No     No family history significant for breast or ovarian cancer. Hormonal History:   a.0  M.0  Menarche at age 13. First pregnancy at age 21. Did breastfeed for one year and 2 months total .  premenopausal  Hysterectomy: no hysterectomy  Denies estrogen supplementation  OCP use for 10 years     Medications:documentation has been reviewed in the electronic medical record and patient office intake form. REVIEW OF SYSTEMS:  Constitutional: Negative for fever  HENT: Negative for sore throat  Eyes: Negative for redness   Respiratory: Negative for dyspnea, cough  Cardiovascular: Negative for chest pain  Gastrointestinal: Negative for vomiting, diarrhea   Genitourinary: Negative for hematuria   Musculoskeletal: Negative for arthralgias   Skin: Negative for rash  Neurological: Negative for syncope  Hematological: Negative for adenopathy  Psychiatric/Behavorial: Negative for anxiety    EXAM:  /71   Pulse 76   Temp 97.9 °F (36.6 °C) (Skin)   Ht 5' (1.524 m)   Wt 120 lb (54.4 kg)   SpO2 98%   BMI 23.44 kg/m²   Physical Exam  Constitutional: She appearswell-nourished. No apparent distress.   Breast: The patient was examined in the upright and supine position. She has a \"B cup breast. Breasts are symmetrically ptotic. Right: No new masses or changes in breast contour. No skin changes of the breast or nipple areolar complex. No nipple inversion or discharge. No erythema, thickening (peau d'orange), or dimpling. Left: No new masses or changes in breast contour. No skin changes of the breast or nipple areolar complex. No nipple inversion or discharge. No erythema, thickening (peau d'orange), or dimpling. There is no axillary lymphadenopathy palpated bilaterally. Head: Normocephalic and atraumatic:   Eyes: EOM are normal. Pupils are equal, round, and reactive to light. Neck: Neck supple. No tracheal deviation present. No obvious mass. Cardiovascular: regular rate. Pulmonary: No accessory muscle use. Respirations non-labored and no wheezing. Lymphatics: No palpable supraclavicular, cervical, or axillary lymphadenopathy  Skin: No rash noted. No erythema. Neurologic: alert and oriented. Extremities: appear well perfused. No edema. No neck or shoulder point tenderness        Risk assessment using RAYSHAWN Breast Cancer Risk Evaluation Tool to evaluate her risk compared to the general population. Her lifetime risk for breast cancer is 9.7% (general population average 13%). ASSESSMENT:  - Breast pain - no concerning findings suggestive of malignancy or direct cause for breast pain on most recent imaging from 8/2020 and 1/2020. No concerning findings for breast pain on clinical exam today. - History of right breast biopsy 3/2012 at [facility] fibroadenoma on pathology. PLAN:   - Will order bilateral diagnostic mammogram as pain seems to be worsening, her last mammogram was over a year ago, and she is now 44yo. - Left breast U/S for breast pain.   - Reassured patient there is no concern for malignancy at this time   - Discussed possible benefit from primrose oil, vitamin E, and topical NSAIDS  - Hormonal breast pain: recommend to keep a journal for breast pain and dietary changes  - Recommend decreasing fried/fatty foods and and decreasing/eliminating caffeine   - Recommend wearing a supportive and well fitting bra   - Warm and/or cold compresses and gentle massage may help to reduce pain  - Pain relief with oral anti-inflammatory medications   - Surgical management is not indicated at this time   - Self breast awareness reviewed   - Healthy Lifestyle Recommendations: healthy diet (decrease consumption of red meat, increase fresh fruits and vegetables), decreased alcohol consumption (less than 4 drinks/week), adequate sleep (goal 6-8 hours), routine exercise (goal 150 minutes/week or greater), weight control. Follow-up: 2 months to re-evaluate breast pain       FRANCISCO Avalos-CNP  [facility])   Surgical Breast Oncology   [phone]      All of the patient's questions were answered at this time however, she was encouraged to call the office with any further inquiries. Approximately 30 minutes of time were spent in this visit of which 50% or more of thetime was related to coordination of care.

## 2021-03-29 ENCOUNTER — HOSPITAL ENCOUNTER (OUTPATIENT)
Dept: ULTRASOUND IMAGING | Age: 41
Discharge: HOME | End: 2021-03-29
Payer: COMMERCIAL

## 2021-03-29 ENCOUNTER — HOSPITAL ENCOUNTER (OUTPATIENT)
Dept: WOMENS IMAGING | Age: 41
Discharge: HOME | End: 2021-03-29
Payer: COMMERCIAL

## 2021-03-29 DIAGNOSIS — N64.4 BREAST PAIN: ICD-10-CM

## 2021-03-29 DIAGNOSIS — Z98.890 HISTORY OF BENIGN BREAST BIOPSY: ICD-10-CM

## 2021-03-29 DIAGNOSIS — N63.0 BREAST LUMP: ICD-10-CM

## 2021-03-29 PROCEDURE — G0279 TOMOSYNTHESIS, MAMMO: HCPCS

## 2021-03-29 PROCEDURE — 76642 ULTRASOUND BREAST LIMITED: CPT

## 2021-05-20 PROBLEM — K21.9 GASTROESOPHAGEAL REFLUX DISEASE WITHOUT ESOPHAGITIS: Status: ACTIVE | Noted: 2021-05-20

## 2021-05-25 NOTE — PROGRESS NOTES
Surgical Breast Oncology     Primary Care Provider: Chica Suresh MD    CC: Breast Pain     HPI:  is a 36 y.o. woman who presents today for for follow up of left lateral breast pain that has been ongoing for 5 plus years but worse over the last few months. Pain is sharp/stabbing, occurs multiple times throughout the day, last for ~5minutes, and resolves spontaneously. Nothing makes the pain better or worse. She is taking Vitamin E 400 units daily with no benefit. She had a mammogram and U/S on 3/29/2021 that showed no concerning findings. She is concerned something no picked up on imaging may be causing the pain. Her bra is supportive. She states that she does perform routine self breast evaluations and has not noticed any new abnormalities such as masses, skin changes, color changes, nipple discharge, or changes to the nipple-areolar complex. Cigarette smoking: non-smoker  Trauma to the breast, neck or shoulder: denies  Chronic neck/shoulder/back pain: denies   Recent changes to menstrual cycle or hormone therapy: denies  Is pain related to menstrual cycle: denies   Bra size:     Previously followed at [facility] for longstanding history of bialteral braest pain. She also has a history of right breast biopsy 3/2012 at [facility] fibroadenoma on pathology. She has no family history of breast or ovarian cancer.     INTERVAL HISTORY:  Bilateral diagnostic mammogram and left breast U/S 1/2/2020:  No suspicious interval change is appreciated mammographically in either  breast.     Targeted ultrasound of the upper-outer left breast demonstrated an [place] of tissue isoechoic to parenchymal breast tissue in echogenic island of tissue 1 o'clock axis 4 cm from the nipple.  This measures 7 mm and demonstrates benign features.  This could represent an [place] of breast tissue versus a fibroadenoma and is considered probably benign.     Recommend six-month follow-up left breast ultrasound to document stability of this finding at the 1 o'clock axis.  Assuming there is no change in the ultrasound appearance in 6 months time, the patient can resume annual mammographic screening in 2021. BI-RADS3. Left breast U/S 2020:  No change in the slightly hypoechoic area which  appears to blend in with the surrounding tissue favoring fibroglandular  tissue.  There is no discrete mass or fluid collection. BI-RADS2. Bilateral diagnostic mammogram 3/29/2021:  No new suspicious mass, architectural distortion or microcalcifications in either right or left breast or in area of patient's concern. BI-RADS 2. Bilateral breast ultrasound 3/29/2021:  Right breast u/s was performed in the area of patient's concern. Images at 11 o'clock axis approximately 7 cm from the nipple reveal no suspicious mass or shadowing. Left breast ultrasound was performed.  In the area of clinical concern 1  o'clock axis 6 cm from the nipple there is no suspicious mass or shadowing lesion identified.  Previously studied area at 1 o'clock axis was re-evaluated.  It demonstrates focal hypoechoic nonaggressive appearing area measuring 6 mm x 6 mm x 4 mm, not significantly changed dating back to 2020.  This likely represents normal breast tissue. BI-RADS2. Review of Systems    Past Medical History:   Diagnosis Date    Headache        Past Surgical History:   Procedure Laterality Date    CARPAL TUNNEL RELEASE  2010     SECTION  ,,2012       Allergies as of 2021    (No Known Allergies)       Social History     Tobacco Use    Smoking status: Never Smoker    Smokeless tobacco: Never Used   Vaping Use    Vaping Use: Never used   Substance Use Topics    Alcohol use: No     Alcohol/week: 0.0 standard drinks    Drug use: No     No family history significant for breast or ovarian cancer. Hormonal History:   a.0  M.0  Menarche at age 13. First pregnancy at age 21.  Did breastfeed for one year and 2 months total .  premenopausal  Hysterectomy: no hysterectomy  Denies estrogen supplementation  OCP use for 10 years     Medications:documentation has been reviewed in the electronic medical record and patient office intake form. REVIEW OF SYSTEMS:  Constitutional: Negative for fever  HENT: Negative for sore throat  Eyes: Negative for redness   Respiratory: Negative for dyspnea, cough  Cardiovascular: Negative for chest pain  Gastrointestinal: Negative for vomiting, diarrhea   Genitourinary: Negative for hematuria   Musculoskeletal: Negative for arthralgias   Skin: Negative for rash  Neurological: Negative for syncope  Hematological: Negative for adenopathy  Psychiatric/Behavorial: Negative for anxiety    EXAM:  /60   Pulse 69   Wt 123 lb (55.8 kg)   SpO2 99%   BMI 24.02 kg/m²   Physical Exam  Constitutional: She appearswell-nourished. No apparent distress. Breast: The patient was examined in the upright and supine position. She has a \"B\" cup breast. Breasts are symmetric. Right: No new masses or changes in breast contour. No skin changes of the breast or nipple areolar complex. No nipple inversion or discharge. No erythema, thickening (peau d'orange), or dimpling. Left: No new masses or changes in breast contour. No skin changes of the breast or nipple areolar complex. No nipple inversion or discharge. No erythema, thickening (peau d'orange), or dimpling. There is no axillary lymphadenopathy palpated bilaterally. Head: Normocephalic and atraumatic:   Eyes: EOM are normal. Pupils are equal, round, and reactive to light. Neck: Neck supple. No tracheal deviation present. No obvious mass. Pulmonary: No accessory muscle use. Respirations non-labored and no wheezing. Lymphatics: No palpable supraclavicular, cervical, or axillary lymphadenopathy  Skin: No rash noted. No erythema. Neurologic: alert and oriented. Extremities: appear well perfused.  No neck or shoulder point tenderness        Risk assessment using RAYSHAWN Breast Cancer Risk Evaluation Tool to evaluate her risk compared to the general population. Her lifetime risk for breast cancer is 9.7% (general population average 13%). ASSESSMENT:  - Breast pain - no concerning findings suggestive of malignancy or direct cause for breast pain on most recent imaging from 3/29/2021. No concerning findings for breast pain on clinical exam today. - History of abnormal breast imaging - upper-outer left breast demonstrated an [place] of tissue isoechoic to parenchymal breast tissue in echogenic island of tissue 1 o'clock 4 cm from the nipple. 7 mm and demonstrates benign features.  This could represent an [place] of breast tissue versus a fibroadenoma and is considered probably benign per radiology. Area stable on 6 month f/u imaging 8/27/2020 and 3/29/2021.  - History of right breast biopsy 3/2012 at [facility] fibroadenoma on pathology. PLAN:   - Will order breast MRI for further evaluation as pain is continuing and worsening  - Reassured patient there is no concern for malignancy at this time   - Discussed possible benefit from primrose oil, vitamin E, and topical NSAIDS. Trial Diclofenac gel PRN   - Hormonal breast pain: recommend to keep a journal for breast pain and dietary changes  - Recommend decreasing fried/fatty foods and and decreasing/eliminating caffeine   - Recommend wearing a supportive and well fitting bra   - Warm and/or cold compresses and gentle massage may help to reduce pain  - Pain relief with oral anti-inflammatory medications   - Surgical management is not indicated at this time   - Self breast awareness reviewed   - Healthy Lifestyle Recommendations: healthy diet (decrease consumption of red meat, increase fresh fruits and vegetables), decreased alcohol consumption (less than 4 drinks/week), adequate sleep (goal 6-8 hours), routine exercise (goal 150 minutes/week or greater), weight control.     Follow-up: 2 months to re-evaluate breast pain       JOSE Gee  [facility] ([facility])   Surgical Breast Oncology   [phone]      All of the patient's questions were answered at this time however, she was encouraged to call the office with any further inquiries. Approximately 25 minutes of time were spent in this visit of which 50% or more of thetime was related to coordination of care.

## 2021-05-26 ENCOUNTER — OFFICE VISIT (OUTPATIENT)
Dept: BREAST CENTER | Age: 41
End: 2021-05-26
Payer: COMMERCIAL

## 2021-05-26 VITALS
DIASTOLIC BLOOD PRESSURE: 60 MMHG | OXYGEN SATURATION: 99 % | BODY MASS INDEX: 24.02 KG/M2 | HEART RATE: 69 BPM | SYSTOLIC BLOOD PRESSURE: 110 MMHG | WEIGHT: 123 LBS

## 2021-05-26 DIAGNOSIS — Z98.890 HISTORY OF BENIGN BREAST BIOPSY: ICD-10-CM

## 2021-05-26 DIAGNOSIS — R92.8 ABNORMAL FINDING ON BREAST IMAGING: ICD-10-CM

## 2021-05-26 DIAGNOSIS — N64.4 BREAST PAIN: Primary | ICD-10-CM

## 2021-05-26 PROCEDURE — G8427 DOCREV CUR MEDS BY ELIG CLIN: HCPCS | Performed by: NURSE PRACTITIONER

## 2021-05-26 PROCEDURE — 1036F TOBACCO NON-USER: CPT | Performed by: NURSE PRACTITIONER

## 2021-05-26 PROCEDURE — 99213 OFFICE O/P EST LOW 20 MIN: CPT | Performed by: NURSE PRACTITIONER

## 2021-05-26 PROCEDURE — G8420 CALC BMI NORM PARAMETERS: HCPCS | Performed by: NURSE PRACTITIONER

## 2021-05-26 NOTE — PATIENT INSTRUCTIONS
Healthy Lifestyle Recommendations: healthy diet (decrease consumption of red meat, increase fresh fruits and vegetables), decreased alcohol consumption (less than 4 drinks/week), adequate sleep (goal 6-8 hours), routine exercise (goal 150 minutes/week or greater), weight control. MANAGING YOUR BREAST PAIN  Up to 7 in 10 women develop breast pain (Mastodynia) at some stage in their life. Breast pain is usually classed as either cyclic or noncyclic, depending on whether it occurs with each period (cyclic) or not. Cyclic pain where the pain is related to periods. Typically, it occurs in the second half of the monthly cycle, becoming worse in the days just before a period. Cyclic pain seems related to female hormones and periods. Pain starts when the ovary releases the egg, continues until the period begins, and stops at the end of the period. A dull ache is felt in the whole breast but more near the armpit. Cyclic breast pain is very commonly first develops between the ages of 27and 48years old. Noncyclic pain where the pain is non-related to periods. Pain coming from the breast itself -for example, infection or breast-feeding. Pain which does not come from the breast itself. Usually in a pain which is felt in the breast. Noncyclic pain occurs more in woman older than 40 and the pain is not related to your period. WHAT CAUSES BREAST PAIN? The many causes include fibrocystic breast disease; use of estrogen hormones, infection of the breast (mastitis), pregnancy, puberty, normal hormonal changes before puberty or menopause, breastfeeding, and medications, including digoxin, cimetidine, spironolactone and methyldopa. It's not contagious or passed from one generation to another. Breastfeeding          Pregnancy             Puberty        Mastitis           Medications        WHAT ARE THE SYMPTOMS OF BREAST PAIN? In many women the symptoms are mild.  Indeed, it can be considered normal to have some breast discomfort for a few days before a period. However, in some women the pain can be severe and/or last longer. The 3-5 days prior to a period are usually the worst. In a few women, the pain last up to two weeks before a period. The pain usually eases soon after a period starts. The severity usually varies from month to month. Typically, the pain affects both breast. It is usually worse in the upper and outer part of the breast and may travel to the inner part of the upper arm. HOW IS BREAST PAIN DIAGNOSED? The doctor makes a diagnosis from a medical history and breast examination. Also, the doctor may order mammography (a special x-ray examination of the breast). If the mammogram shows a lump the doctor may order an ultrasound (a test using sound waves to see whether the lump is solid or fluid-filled). Breast pain rarely means breast cancer. For most  women with breast pain unrelated to breast                          cancer, pain stops on its own. Symptoms    that interfere with normal daily activities can be    helped with pain medications such as anitinflammatory drugs such as Ibuprofen. Wear proper fitting bras-they should  not be too tight or too loose. If you are  unsure, have a bra fitting done; many   women wear the wrong size bra. Eat low-fat, low-salt foods, and whole grain instead of processed grains. Avoid foods that trigger breast pain                                      DO AND DONT'S IN MANAGING BREAST PAIN   DO remember that breast cancer is very rarely (< 10%) cause breast pain   DO eat low-fat foods, free of hydrogenated fats such as those in margarine. Eat whole grains instead of processed grains   DO call your doctor if you feel a lump in your breast.   DO call your doctor if you see a discharge from your nipple   DO call your doctor if you see irregular dimpling of the breast or nipple   DO call your doctor if you have a fever, fatigue, or nausea.    DO

## 2021-06-04 ENCOUNTER — HOSPITAL ENCOUNTER (OUTPATIENT)
Dept: MRI IMAGING | Age: 41
Discharge: HOME | End: 2021-06-04
Payer: COMMERCIAL

## 2021-06-04 DIAGNOSIS — N64.4 BREAST PAIN: ICD-10-CM

## 2021-06-04 DIAGNOSIS — R92.8 ABNORMAL FINDING ON BREAST IMAGING: ICD-10-CM

## 2021-06-04 DIAGNOSIS — Z98.890 HISTORY OF BENIGN BREAST BIOPSY: ICD-10-CM

## 2021-06-04 PROCEDURE — 6360000004 HC RX CONTRAST MEDICATION: Performed by: NURSE PRACTITIONER

## 2021-06-04 PROCEDURE — A9577 INJ MULTIHANCE: HCPCS | Performed by: NURSE PRACTITIONER

## 2021-06-04 PROCEDURE — 77049 MRI BREAST C-+ W/CAD BI: CPT

## 2021-06-04 RX ADMIN — GADOBENATE DIMEGLUMINE 11 ML: 529 INJECTION, SOLUTION INTRAVENOUS at 09:21

## 2021-07-23 ENCOUNTER — TELEPHONE (OUTPATIENT)
Dept: BREAST CENTER | Age: 41
End: 2021-07-23

## 2021-07-23 NOTE — TELEPHONE ENCOUNTER
Patient called to cancel her appointment for 7/28/21 with Chilo Romano. She feels she does not need the appointment at this time and says she will call back if she feels she needs to be seen.

## 2022-04-20 ENCOUNTER — OFFICE VISIT (OUTPATIENT)
Dept: BREAST CENTER | Age: 42
End: 2022-04-20
Payer: COMMERCIAL

## 2022-04-20 VITALS
HEART RATE: 85 BPM | BODY MASS INDEX: 23.13 KG/M2 | DIASTOLIC BLOOD PRESSURE: 76 MMHG | SYSTOLIC BLOOD PRESSURE: 118 MMHG | WEIGHT: 117.8 LBS | HEIGHT: 60 IN

## 2022-04-20 DIAGNOSIS — N64.4 PAIN OF BOTH BREASTS: Primary | ICD-10-CM

## 2022-04-20 PROCEDURE — 99213 OFFICE O/P EST LOW 20 MIN: CPT | Performed by: SURGERY

## 2022-04-20 PROCEDURE — G8427 DOCREV CUR MEDS BY ELIG CLIN: HCPCS | Performed by: SURGERY

## 2022-04-20 PROCEDURE — G8420 CALC BMI NORM PARAMETERS: HCPCS | Performed by: SURGERY

## 2022-04-20 PROCEDURE — 1036F TOBACCO NON-USER: CPT | Performed by: SURGERY

## 2022-04-20 NOTE — PROGRESS NOTES
Subjective:      Patient ID: Yesenia Lewis is a 39 y.o. female. HPI   Chief Complaint   Patient presents with    1 Year Follow Up     1 year follow up     Patient is here for follow up of bilateral lateral breast pain, present for several years. Intermittent, burning, occurs multiple times throughout the day. Nothing makes the pain better or worse. She is taking vitamin E. Here with her . Previously followed at [facility] for longstanding history of bialteral braest pain. She also has a history of right breast biopsy 3/2012 at [facility] fibroadenoma on pathology.      She has no family history of breast or ovarian cancer. Bilateral mammogram and ultrasound 3/2021 BIRADS 2C  MRI 2021 BIRADS 2.      Past Medical History:   Diagnosis Date    Headache        Past Surgical History:   Procedure Laterality Date    CARPAL TUNNEL RELEASE       SECTION  ,,       Current Outpatient Medications   Medication Sig Dispense Refill    acyclovir (ZOVIRAX) 400 MG tablet TAKE ONE TABLET BY MOUTH THREE TIMES A DAY FOR 15 DAYS 45 tablet 0    PARoxetine (PAXIL) 20 MG tablet Take 1 tablet daily 90 tablet 1    dexlansoprazole (DEXILANT) 60 MG CPDR delayed release capsule Take 1 capsule by mouth daily 90 capsule 3    vitamin D3 (CHOLECALCIFEROL) 25 MCG (1000 UT) TABS tablet Take 1 tablet by mouth daily 30 tablet 5    tiZANidine (ZANAFLEX) 2 MG tablet TAKE ONE TABLET BY MOUTH EVERY 8 HOURS AS NEEDED FOR PAIN 45 tablet 1    omeprazole (PRILOSEC) 40 MG delayed release capsule Take 1 capsule by mouth 2 times daily 120 capsule 2    Cholecalciferol (VITAMIN D3) 1.25 MG (19798 UT) CAPS TAKE 1 CAPSULE BY MOUTH 1 TIME A WEEK FOR 12 WEEKS 12 capsule 0    acetaminophen (TYLENOL) 500 MG tablet Take 1 tablet by mouth 4 times daily as needed for Pain 120 tablet 0    butalbital-acetaminophen-caffeine (FIORICET, ESGIC) -40 MG per tablet TAKE 1 TABLET BY MOUTH EVERY 4 HOURS AS NEEDED FOR HEADACHES 60 tablet 5    etonogestrel (NEXPLANON) 68 MG implant 68 mg by Subdermal route once       No current facility-administered medications for this visit. Social History     Socioeconomic History    Marital status:      Spouse name: Not on file    Number of children: 3    Years of education: Not on file    Highest education level: Not on file   Occupational History    Occupation: nail salon   Tobacco Use    Smoking status: Never Smoker    Smokeless tobacco: Never Used   Vaping Use    Vaping Use: Never used   Substance and Sexual Activity    Alcohol use: No     Alcohol/week: 0.0 standard drinks    Drug use: No    Sexual activity: Yes     Partners: Male   Other Topics Concern    Not on file   Social History Narrative    Lives with  and children     Social Determinants of Health     Financial Resource Strain: Low Risk     Difficulty of Paying Living Expenses: Not very hard   Food Insecurity: No Food Insecurity    Worried About Running Out of Food in the Last Year: Never true    Audi of Food in the Last Year: Never true   Transportation Needs:     Lack of Transportation (Medical): Not on file    Lack of Transportation (Non-Medical):  Not on file   Physical Activity:     Days of Exercise per Week: Not on file    Minutes of Exercise per Session: Not on file   Stress:     Feeling of Stress : Not on file   Social Connections:     Frequency of Communication with Friends and Family: Not on file    Frequency of Social Gatherings with Friends and Family: Not on file    Attends Hinduism Services: Not on file    Active Member of Clubs or Organizations: Not on file    Attends Club or Organization Meetings: Not on file    Marital Status: Not on file   Intimate Partner Violence:     Fear of Current or Ex-Partner: Not on file    Emotionally Abused: Not on file    Physically Abused: Not on file    Sexually Abused: Not on file   Housing Stability:     Unable to Pay for Housing in the Last Year: Not on

## 2022-04-20 NOTE — PATIENT INSTRUCTIONS
Breast exam was unremarkable for any palpable masses  Use (OTC) Voltaren Gel twice a day for pain  Continue with monthly self breast exams  Return to office in 6 months for breast check and office visit      Healthy Lifestyle Recommendations: healthy diet (decrease consumption of red meat, increase fresh fruits and vegetables), decreased alcohol consumption (less than 4 drinks/week), adequate sleep (goal 6-8 hours), routine exercise (goal 150 minutes/week or greater), weight control.

## 2022-05-20 ENCOUNTER — HOSPITAL ENCOUNTER (OUTPATIENT)
Dept: WOMENS IMAGING | Age: 42
Discharge: HOME | End: 2022-05-20
Payer: COMMERCIAL

## 2022-05-20 ENCOUNTER — HOSPITAL ENCOUNTER (OUTPATIENT)
Dept: ULTRASOUND IMAGING | Age: 42
Discharge: HOME | End: 2022-05-20
Payer: COMMERCIAL

## 2022-05-20 DIAGNOSIS — N63.0 BREAST LUMP: ICD-10-CM

## 2022-05-20 DIAGNOSIS — N64.4 PAIN OF BOTH BREASTS: ICD-10-CM

## 2022-05-20 PROCEDURE — G0279 TOMOSYNTHESIS, MAMMO: HCPCS

## 2022-05-20 PROCEDURE — 76642 ULTRASOUND BREAST LIMITED: CPT

## 2022-10-20 ENCOUNTER — TELEPHONE (OUTPATIENT)
Dept: BREAST CENTER | Age: 42
End: 2022-10-20

## 2022-10-20 NOTE — TELEPHONE ENCOUNTER
Patient called to cancel appointment for 10/21/22 due to patient does not want to reschedule. Offered to reschedule appointment, patient declined at this time. Patient states they will call back to reschedule. If appointment is cancelled less than 24 hours from scheduled appointment, it is considered a same day cancellation.

## 2023-02-22 ENCOUNTER — OFFICE VISIT (OUTPATIENT)
Dept: ORTHOPEDIC SURGERY | Age: 43
End: 2023-02-22
Payer: COMMERCIAL

## 2023-02-22 VITALS — HEIGHT: 60 IN | WEIGHT: 125 LBS | BODY MASS INDEX: 24.54 KG/M2

## 2023-02-22 DIAGNOSIS — M77.12 LATERAL EPICONDYLITIS OF LEFT ELBOW: ICD-10-CM

## 2023-02-22 DIAGNOSIS — M25.522 LEFT ELBOW PAIN: Primary | ICD-10-CM

## 2023-02-22 PROCEDURE — G8420 CALC BMI NORM PARAMETERS: HCPCS | Performed by: PHYSICIAN ASSISTANT

## 2023-02-22 PROCEDURE — 1036F TOBACCO NON-USER: CPT | Performed by: PHYSICIAN ASSISTANT

## 2023-02-22 PROCEDURE — G8427 DOCREV CUR MEDS BY ELIG CLIN: HCPCS | Performed by: PHYSICIAN ASSISTANT

## 2023-02-22 PROCEDURE — G8482 FLU IMMUNIZE ORDER/ADMIN: HCPCS | Performed by: PHYSICIAN ASSISTANT

## 2023-02-22 PROCEDURE — 99203 OFFICE O/P NEW LOW 30 MIN: CPT | Performed by: PHYSICIAN ASSISTANT

## 2023-02-22 RX ORDER — NORTRIPTYLINE HYDROCHLORIDE 25 MG/1
CAPSULE ORAL
COMMUNITY
Start: 2021-12-15

## 2023-02-22 RX ORDER — ERGOCALCIFEROL 1.25 MG/1
5000 CAPSULE ORAL DAILY
COMMUNITY

## 2023-02-22 RX ORDER — METHYLPREDNISOLONE 4 MG/1
TABLET ORAL
Qty: 1 KIT | Refills: 0 | Status: SHIPPED | OUTPATIENT
Start: 2023-02-22

## 2023-02-22 NOTE — PROGRESS NOTES
Subjective:      Patient ID: Mark Brasher is a 43 y.o. female. HPI:   She is here for an initial evaluation of left lateral elbow and forearm pain. Onset of symptoms 3 months. These symptoms have been progressive in nature. There is not a history of injury. Pain is intermittent, mild, moderate. Location of pain lateral elbow and into forearm over the extensor tendons of the wrist and hand. Pain is on average 4/10. Pain is worse with repetitive activity, lifting or gripping. Pain improves with rest.   There is not associated numbness/ tingling. Previous treatments have included: Recently completed Naprosyn 7-day prescription with no relief or improvement. Review of Systems:  I have reviewed the clinically relevant past medical history, medications, allergies, family history, social history, and 13 point Review of Systems from the patient's recent history form & documented any details relevant to today's presenting complaints in the history above. The patient's self-reported past medical history, medications, allergies, family history, social history, and Review of Systems form from today's date have been scanned into the chart under the \"Media\" tab.     Past Medical History:   Diagnosis Date    Headache        Family History   Problem Relation Age of Onset    High Cholesterol Mother     Ovarian Cancer Maternal Aunt        Past Surgical History:   Procedure Laterality Date    CARPAL TUNNEL RELEASE  2010     SECTION  2003,,2012       Social History     Occupational History    Occupation: nail salon   Tobacco Use    Smoking status: Never    Smokeless tobacco: Never   Vaping Use    Vaping Use: Never used   Substance and Sexual Activity    Alcohol use: No     Alcohol/week: 0.0 standard drinks    Drug use: No    Sexual activity: Yes     Partners: Male       Current Outpatient Medications   Medication Sig Dispense Refill    nortriptyline (PAMELOR) 25 MG capsule       methylPREDNISolone (MEDROL, BRITTANI,) 4 MG tablet Take by mouth. 6 po day one 5 po day 2 4 po day 3 3 po day 4 2 po day 5 1 po day 6. 1 kit 0    ergocalciferol (ERGOCALCIFEROL) 1.25 MG (85249 UT) capsule Take 5,000 Units by mouth daily      vitamin D3 (CHOLECALCIFEROL) 25 MCG (1000 UT) TABS tablet Take 1 tablet by mouth daily 30 tablet 5    tiZANidine (ZANAFLEX) 2 MG tablet TAKE ONE TABLET BY MOUTH EVERY 8 HOURS AS NEEDED FOR PAIN 45 tablet 1    acyclovir (ZOVIRAX) 400 MG tablet TAKE ONE TABLET BY MOUTH THREE TIMES A DAY FOR 15 DAYS 45 tablet 0    atovaquone-proguanil (MALARONE) 250-100 MG per tablet Take 1 tablet by mouth daily Take 1 tablet by mouth daily start 2 days prior to leaving and 7 days after returning 30 tablet 0    butalbital-acetaminophen-caffeine (FIORICET, ESGIC) -40 MG per tablet Take 1 tablet by mouth every 4 hours as needed for Headaches 60 tablet 5    PARoxetine (PAXIL) 20 MG tablet Take 1 tablet daily 90 tablet 1    dexlansoprazole (DEXILANT) 60 MG CPDR delayed release capsule Take 1 capsule by mouth daily 90 capsule 3    omeprazole (PRILOSEC) 40 MG delayed release capsule Take 1 capsule by mouth 2 times daily 120 capsule 2    Cholecalciferol (VITAMIN D3) 1.25 MG (98423 UT) CAPS TAKE 1 CAPSULE BY MOUTH 1 TIME A WEEK FOR 12 WEEKS 12 capsule 0    acetaminophen (TYLENOL) 500 MG tablet Take 1 tablet by mouth 4 times daily as needed for Pain 120 tablet 0    etonogestrel (NEXPLANON) 68 MG implant 68 mg by Subdermal route once       No current facility-administered medications for this visit. Objective:   She  is alert, oriented x 3, pleasant, well nourished, developed and in no acute distress. Ht 5' (1.524 m)   Wt 125 lb (56.7 kg)   BMI 24.41 kg/m²      Left elbow exam:  There is no obvious deformity. There is no effusion of the joint. There is full range of motion. There is no instability of the elbow. There is moderate tenderness over the lateral epicondyle.   There is minimal swelling at the lateral epicondyle. Pain is with resisted wrist extension and pronation. Upper extremities:  She has 5/5 strength of her interosseous muscles, wrist dorsiflexors and volarflexors, biceps, triceps, deltoids, and internal and external rotators of her shoulders, bilaterally. Her biceps, triceps, bracheoradialis, quadriceps and achilles reflexes are 2+, bilaterally. Sensation is intact to light touchfrom C6 to C8. She has no clonus and negative Ortiz's bilaterally. Examination of the upper extremities shows intact perfusion to all extremities. No cyanosis and digigts are warm to touch, capillary refill is less than 2 seconds. There is no edema noted. Intact skin without lacerations or abrasions, no significant erythema, rashes or skin lesions. X Rays: were performed in the office today:   AP and lateral left elbow negative for bony abnormality or significant soft tissue swelling. Diagnosis:       ICD-10-CM    1. Left elbow pain  M25.522 XR ELBOW LEFT (2 VIEWS)      2. Lateral epicondylitis of left elbow  M77.12            Assessment/ Plan:     Assessment:  Left elbow pain, lateral epicondylitis. I had an extensive discussion with Ms. Danita Avila and family regarding the natural history, etiology, and long term consequences of her condition. I have presented reasonable alternatives to the patient's proposed care, treatment, and services. Risks and benefits of the treatment options also reviewed in detail. I have outlined a treatment plan with them. She has had full opportunity to ask her questions. I have answered them all to her satisfaction. I feel that Ms. Danita Avila understands our discussion today     Plan:  Medications-Medrol Dosepak 4 mg tablets. PT- A home exercise program was instructed today including ROM exercises and strengthening exercises.  The patient verbalized understanding of these exercises as well as the importance of the exercise program to promote return of normal function. If pain intensifies or other problems arise you are to notify the office. Procedures-discussed lateral epicondylar steroid injection if symptoms fail to improve with conservative treatment. Follow up- 4 weeks. Call or return to clinic if these symptoms worsen or fail to improve as anticipated. Shawna Graham PA-C   Senior Physician Assistant   [facility]                                         Disclaimer: This note was generated with use of a verbal recognition program (DRAGON) and an attempt was made to check for errors. It is possible that there are still dictated errors within this office note. If so, please bring any significant errors to my attention for an addendum. All efforts were made to ensure that this office note is accurate.

## 2023-03-01 ENCOUNTER — OFFICE VISIT (OUTPATIENT)
Dept: ORTHOPEDIC SURGERY | Age: 43
End: 2023-03-01
Payer: COMMERCIAL

## 2023-03-01 VITALS — HEIGHT: 60 IN | WEIGHT: 125 LBS | BODY MASS INDEX: 24.54 KG/M2

## 2023-03-01 DIAGNOSIS — M54.2 NECK PAIN: Primary | ICD-10-CM

## 2023-03-01 DIAGNOSIS — M54.12 CERVICAL RADICULAR PAIN: ICD-10-CM

## 2023-03-01 PROCEDURE — G8482 FLU IMMUNIZE ORDER/ADMIN: HCPCS | Performed by: PHYSICIAN ASSISTANT

## 2023-03-01 PROCEDURE — 99214 OFFICE O/P EST MOD 30 MIN: CPT | Performed by: PHYSICIAN ASSISTANT

## 2023-03-01 PROCEDURE — G8420 CALC BMI NORM PARAMETERS: HCPCS | Performed by: PHYSICIAN ASSISTANT

## 2023-03-01 PROCEDURE — 1036F TOBACCO NON-USER: CPT | Performed by: PHYSICIAN ASSISTANT

## 2023-03-01 PROCEDURE — G8427 DOCREV CUR MEDS BY ELIG CLIN: HCPCS | Performed by: PHYSICIAN ASSISTANT

## 2023-03-01 NOTE — PROGRESS NOTES
New Patient: CERV[facility] SPINE    Referring Provider:  No ref. provider found    CHIEF COMPLAINT:    Chief Complaint   Patient presents with    Neck Pain     Chronic, R-sided radiculopathy       HISTORY OF PRESENT ILLNESS:   Ms. Soo Jon is a pleasant 43 y.o. old female here for consultation regarding her neck and right arm pain. She states the pain began 4 years ago. Her pain has steadily worsened since then. She rates her neck pain 4/10 VAS and right posterior shoulder and arm pain 7/10 VAS. She describes the pain as aches. Numbness, tingling right upper extremity symptoms began more recently. Pain is worst with activity and improved some with recently prescribed Medrol Dosepak and tizanidine. The arm pain radiates to her right fingers. She reports numbness and tingling in the right upper extremity. She denies weakness of her right arm. Patient denies difficulty with fine motor skills. She denies lower extremity symptoms, gait abnormality, saddle numbness and bowel or bladder dysfunction. The pain does interfere with her sleep. Current/Past Treatment:   Physical Therapy: Was referred to therapy couple years ago but did not go due to work schedule  Chiropractic: None. Injection: None.   Medications: NSAIDs    Past Medical History:   Past Medical History:   Diagnosis Date    Headache         Past Surgical History:     Past Surgical History:   Procedure Laterality Date    CARPAL TUNNEL RELEASE  2010     SECTION  ,,       Current Medications:     Current Outpatient Medications:     ergocalciferol (ERGOCALCIFEROL) 1.25 MG (23695 UT) capsule, Take 5,000 Units by mouth daily, Disp: , Rfl:     nortriptyline (PAMELOR) 25 MG capsule, , Disp: , Rfl:     vitamin D3 (CHOLECALCIFEROL) 25 MCG (1000 UT) TABS tablet, Take 1 tablet by mouth daily, Disp: 30 tablet, Rfl: 5    tiZANidine (ZANAFLEX) 2 MG tablet, TAKE ONE TABLET BY MOUTH EVERY 8 HOURS AS NEEDED FOR PAIN, Disp: 45 tablet, Rfl: 1 acyclovir (ZOVIRAX) 400 MG tablet, TAKE ONE TABLET BY MOUTH THREE TIMES A DAY FOR 15 DAYS, Disp: 45 tablet, Rfl: 0    atovaquone-proguanil (MALARONE) 250-100 MG per tablet, Take 1 tablet by mouth daily Take 1 tablet by mouth daily start 2 days prior to leaving and 7 days after returning, Disp: 30 tablet, Rfl: 0    butalbital-acetaminophen-caffeine (FIORICET, ESGIC) -40 MG per tablet, Take 1 tablet by mouth every 4 hours as needed for Headaches, Disp: 60 tablet, Rfl: 5    PARoxetine (PAXIL) 20 MG tablet, Take 1 tablet daily, Disp: 90 tablet, Rfl: 1    dexlansoprazole (DEXILANT) 60 MG CPDR delayed release capsule, Take 1 capsule by mouth daily, Disp: 90 capsule, Rfl: 3    omeprazole (PRILOSEC) 40 MG delayed release capsule, Take 1 capsule by mouth 2 times daily, Disp: 120 capsule, Rfl: 2    Cholecalciferol (VITAMIN D3) 1.25 MG (23861 UT) CAPS, TAKE 1 CAPSULE BY MOUTH 1 TIME A WEEK FOR 12 WEEKS, Disp: 12 capsule, Rfl: 0    acetaminophen (TYLENOL) 500 MG tablet, Take 1 tablet by mouth 4 times daily as needed for Pain, Disp: 120 tablet, Rfl: 0    etonogestrel (NEXPLANON) 68 MG implant, 68 mg by Subdermal route once, Disp: , Rfl:     Allergies:  Patient has no known allergies. Social History:    reports that she has never smoked. She has never used smokeless tobacco. She reports that she does not drink alcohol and does not use drugs. Family History:   Family History   Problem Relation Age of Onset    High Cholesterol Mother     Ovarian Cancer Maternal Aunt          Review of Systems:  I have reviewed the clinically relevant past medical history, medications, allergies, family history, social history, and 13 point Review of Systems from the patient's recent history form & documented any details relevant to today's presenting complaints in the history above.  The patient's self-reported past medical history, medications, allergies, family history, social history, and Review of Systems form from 2/22/2023 date as well as spine form from today's date have been scanned into the chart under the \"Media\" tab. PHYSICAL EXAM:    Vitals: Height 5' (1.524 m), weight 125 lb (56.7 kg), not currently breastfeeding. GENERAL EXAM:  General Apparence: Patient is adequately groomed with no evidence of malnutrition. Orientation: The patient is oriented to time, place and person. Mood & Affect:The patient's mood and affect are appropriate   Vascular: Examination reveals no swelling tenderness in upper or lower extremities. Good capillary refill  Lymphatic: The lymphatic examination bilaterally reveals all areas to be without enlargement or induration  Sensation: Sensation is intact without deficit  Coordination/Balance: Good coordination. Tandem walking normal.     CERVICAL EXAMINATION:  Inspection: Local inspection shows no step-off or bruising. Cervical alignment is normal.     Palpation: Tender to palpation over right posterior scapula and trapezius. No tenderness at the midline. Paraspinal tenderness is present. There is no step-off or paraspinal spasm. Range of Motion: Cervical flexion, extension, and rotation are mildly reduced with pain. Strength: 5/5 bilateral upper extremities   Special Tests:    Spurling's negative & Ortiz's negative bilaterally. Cubital Tunnel Tinel's positive on the right. Carpal Tunnel Tinel's positive on the right. Skin:There are no rashes, ulcerations or lesions in right & left upper extremities. Reflexes: Bilaterally triceps, biceps and brachioradialis are 2+. Gait & station: normal, patient ambulates without assistance. Additional Examinations:       RIGHT UPPER EXTREMITY:  Inspection/examination of the right upper extremity does not show any tenderness, deformity or injury. Range of motion is unremarkable. There is no gross instability. There are no rashes, ulcerations or lesions.  Strength and tone are normal.  LEFT UPPER EXTREMITY: Inspection/examination of the left upper extremity does not show any tenderness, deformity or injury. Range of motion is unremarkable. There is no gross instability. There are no rashes, ulcerations or lesions. Strength and tone are normal.      Diagnostic Testing:  AP and lateral cervical spine shows normal curvature without degenerative disc disease or facet arthritis. 1. Neck pain    2. Cervical radicular pain          Impression:   Right upper arm pain probable cervical radicular nature. May have some overlapping carpal tunnel and/or ulnar nerve entrapment with positive Tinel's at the elbow and wrist.    I had an extensive discussion with Ms. Jodie Curling and/or family regarding the natural history, etiology, and long term consequences of her condition. I have presented reasonable alternatives to the patient's proposed care, treatment, and services. Risks and benefits of the treatment options also reviewed in detail. I have outlined a treatment plan with them. She has had full opportunity to ask her questions. I have answered them all to her satisfaction. I feel that Ms. Jodie Curling understands our discussion today. Medications-   OTC NSAIDS discussed. The most common side effects from NSAIDs are stomachaches, heartburn, and nausea. NSAIDs may irritate the stomach lining. If the medicine upsets your stomach, you can try taking it with food. But if that doesn't help, talk with your doctor to make sure it's not a more serious problem, such as a stomach ulcer or bleeding in the stomach or intestines. Using NSAIDs may:  Lead to high blood pressure. Make symptoms of heart failure worse. Raise the risk of heart attack, stroke, kidney damage, and skin reactions. Your risks are greater if you take NSAIDs at higher doses or for longer than the label says. People who are older than 72 or who have heart, stomach, or intestinal disease have a higher risk for problems.       PT-   A home exercise program was instructed today including ROM exercises and strengthening exercises. The patient verbalized understanding of these exercises as well as the importance of the exercise program to promote return of normal function. If pain intensifies or other problems arise you are to notify the office. Further Imaging-EMG will be obtained of the right upper extremity. Follow up-after EMG to review test results and discuss further treatment options. Call or return to clinic if these symptoms worsen or fail to improve as anticipated. The total time spent on today's visit including reviewing test results, history, performance of physical exam, counseling/ education, ordering of medications, tests or procedures was 35 minutes. This time does include completion of the medical record. This time excludes any time spent performing procedures or tests in the office. Andrea Orozco PA-C   Senior Physician Assistant   [facility]                                         Disclaimer: This note was generated with use of a verbal recognition program (DRAGON) and an attempt was made to check for errors. It is possible that there are still dictated errors within this office note. If so, please bring any significant errors to my attention for an addendum. All efforts were made to ensure that this office note is accurate.

## 2023-03-09 ENCOUNTER — PROCEDURE VISIT (OUTPATIENT)
Dept: NEUROLOGY | Age: 43
End: 2023-03-09
Payer: COMMERCIAL

## 2023-03-09 DIAGNOSIS — M79.601 RIGHT ARM PAIN: Primary | ICD-10-CM

## 2023-03-09 PROCEDURE — 95909 NRV CNDJ TST 5-6 STUDIES: CPT | Performed by: PSYCHIATRY & NEUROLOGY

## 2023-03-09 PROCEDURE — 95886 MUSC TEST DONE W/N TEST COMP: CPT | Performed by: PSYCHIATRY & NEUROLOGY

## 2023-03-09 NOTE — PROGRESS NOTES
Kaushik Fleming M.D. [facility]) Phys[facility]  Board Certified in [address] [facility], [address] [facility], [address] [facility]    EMG / NERVE CONDUCTION STUDY      PATIENT:  Brea Martinez       DATE OF EM23     YOB: 1980       REASON FOR EMG:   Right shoulder normal.  Rule out cervical radiculopathy      REFERRING PHYSICIAN:  Lars Varela, [address]. [place] [address]     SUMMARY:   The right median motor and sensory nerve studies were normal.  The right ulnar motor and sensory nerve studies were normal.  The right radial sensory nerve study was normal.  Needle EMG of several muscles in the right upper extremity was normal.  Needle EMG of the right cervical paraspinal muscles was normal.      CLINICAL DIAGNOSIS:  Cervical radiculopathy        EMG RESULTS:     This is a normal EMG and nerve conduction study of the right upper extremity. There is no electrophysiological evidence for cervical radiculopathy or neuropathy in this study. ---------------------------------------------  Kaushik Fleming M.D.   Electromyographer / Neurologist

## 2023-03-20 ENCOUNTER — OFFICE VISIT (OUTPATIENT)
Dept: ORTHOPEDIC SURGERY | Age: 43
End: 2023-03-20
Payer: COMMERCIAL

## 2023-03-20 VITALS — RESPIRATION RATE: 18 BRPM | HEIGHT: 60 IN | BODY MASS INDEX: 24.54 KG/M2 | WEIGHT: 125 LBS

## 2023-03-20 DIAGNOSIS — M54.12 CERVICAL RADICULAR PAIN: Primary | ICD-10-CM

## 2023-03-20 PROCEDURE — G8482 FLU IMMUNIZE ORDER/ADMIN: HCPCS | Performed by: PHYSICIAN ASSISTANT

## 2023-03-20 PROCEDURE — G8420 CALC BMI NORM PARAMETERS: HCPCS | Performed by: PHYSICIAN ASSISTANT

## 2023-03-20 PROCEDURE — G8427 DOCREV CUR MEDS BY ELIG CLIN: HCPCS | Performed by: PHYSICIAN ASSISTANT

## 2023-03-20 PROCEDURE — 1036F TOBACCO NON-USER: CPT | Performed by: PHYSICIAN ASSISTANT

## 2023-03-20 PROCEDURE — 99213 OFFICE O/P EST LOW 20 MIN: CPT | Performed by: PHYSICIAN ASSISTANT

## 2023-03-20 RX ORDER — METHYLPREDNISOLONE 4 MG/1
TABLET ORAL
Qty: 1 KIT | Refills: 0 | Status: SHIPPED | OUTPATIENT
Start: 2023-03-20

## 2023-03-20 NOTE — PROGRESS NOTES
that there are still dictated errors within this office note. If so, please bring any significant errors to my attention for an addendum. All efforts were made to ensure that this office note is accurate.

## 2023-03-24 ENCOUNTER — TELEPHONE (OUTPATIENT)
Dept: ORTHOPEDIC SURGERY | Age: 43
End: 2023-03-24

## 2023-03-24 NOTE — TELEPHONE ENCOUNTER
I spoke with  and he said the medrol dose lelo was upsetting her stomach so she stopped taking the steroids after the 3rd day. I told him that we will ask provider on Monday. He was [facility] with that.

## 2023-03-29 ENCOUNTER — HOSPITAL ENCOUNTER (OUTPATIENT)
Dept: PHYSICAL THERAPY | Age: 43
Setting detail: THERAPIES SERIES
Discharge: HOME | End: 2023-03-29
Payer: COMMERCIAL

## 2023-03-29 ENCOUNTER — OFFICE VISIT (OUTPATIENT)
Dept: BREAST CENTER | Age: 43
End: 2023-03-29
Payer: COMMERCIAL

## 2023-03-29 VITALS — DIASTOLIC BLOOD PRESSURE: 71 MMHG | SYSTOLIC BLOOD PRESSURE: 118 MMHG | HEART RATE: 83 BPM

## 2023-03-29 DIAGNOSIS — N64.52 NIPPLE DISCHARGE IN FEMALE: Primary | ICD-10-CM

## 2023-03-29 PROCEDURE — G8420 CALC BMI NORM PARAMETERS: HCPCS | Performed by: SURGERY

## 2023-03-29 PROCEDURE — 99213 OFFICE O/P EST LOW 20 MIN: CPT | Performed by: SURGERY

## 2023-03-29 PROCEDURE — 97530 THERAPEUTIC ACTIVITIES: CPT

## 2023-03-29 PROCEDURE — 97110 THERAPEUTIC EXERCISES: CPT

## 2023-03-29 PROCEDURE — 1036F TOBACCO NON-USER: CPT | Performed by: SURGERY

## 2023-03-29 PROCEDURE — 97112 NEUROMUSCULAR REEDUCATION: CPT

## 2023-03-29 PROCEDURE — G8482 FLU IMMUNIZE ORDER/ADMIN: HCPCS | Performed by: SURGERY

## 2023-03-29 PROCEDURE — 97161 PT EVAL LOW COMPLEX 20 MIN: CPT

## 2023-03-29 PROCEDURE — 97140 MANUAL THERAPY 1/> REGIONS: CPT

## 2023-03-29 PROCEDURE — G8427 DOCREV CUR MEDS BY ELIG CLIN: HCPCS | Performed by: SURGERY

## 2023-03-29 NOTE — FLOWSHEET NOTE
swelling/inflammation/restriction, improving soft tissue extensibility and allowing for proper ROM for normal function with self care, reaching, carrying, lifting, house/yardwork, driving/computer work      Charges:  Timed Code Treatment Minutes: 43   Total Treatment Minutes: 60     [x] EVAL (LOW) 86552 (typically 20 minutes face-to-face)  [] EVAL (MOD) 85453 (typically 30 minutes face-to-face)  [] EVAL (HIGH) 64342 (typically 45 minutes face-to-face)  [] RE-EVAL     [] GN(33038) x     [] Dry needle 1 or 2 Muscles (70722)  [x] NMR (19059) x     [] Dry needle 3+ Muscles (57180)  [x] Manual (57946) x     [] Ultrasound (70019) x  [x] TA (97805) x     [] Mech Traction (11340)  [] ES(attended) (12028)     [] ES (un) (22 [id number]):   [] Vasopump (75873) [] Ionto (05886)   [] Other:    GOALS:  Patient stated goal: \"decrease my pain\"  [] Progressing: [] Met: [] Not Met: [] Adjusted     Therapist goals for Patient:   Short Term Goals: To be achieved in: 2 weeks  1. Independent in HEP and progression per patient tolerance, in order to prevent re-injury. [] Progressing: [] Met: [] Not Met: [] Adjusted  2. Patient will have a decrease in pain by 40% to facilitate improvement in movement, function, and ADLs as indicated by Functional Deficits. [] Progressing: [] Met: [] Not Met: [] Adjusted     Long Term Goals: To be achieved in: at discharge  1. Decreased NDI functional outcome score  to less than 20% disability to assist with reaching prior level of function. [] Progressing: [] Met: [] Not Met: [] Adjusted  2. Patient will demonstrate increased cervical AROM to [facility] of cervical/thoracic spine to allow for proper joint functioning as indicated by patients Functional Deficits. [] Progressing: [] Met: [] Not Met: [] Adjusted  3. Patient will demonstrate an increase in postural awareness and control and scapular strength 4/5 to allow for proper functional mobility as indicated by patients Functional Deficits.    [] Progressing: []

## 2023-03-29 NOTE — PROGRESS NOTES
3/29/2023    Chief Complaint   Patient presents with    Wound Check     Patient presents for right breast abscess        HPI Patient is here with intermittent right nipple white discharge. She noticed a white spot on her nipple about 3 months ago. It got larger, then drained. Now developing a white spot in the same area. No pain. No bleeding. She has a history of bilateral lateral breast pain, present for several years. Intermittent, burning, occurs multiple times throughout the day. Nothing makes the pain better or worse. She is taking vitamin E. Here with her . Previously followed at [facility] for longstanding history of bilateral braest pain. She also has a history of right breast biopsy 3/2012 at [facility] fibroadenoma on pathology. She has no family history of breast or ovarian cancer. Bilateral mammogram and ultrasound 5/2022 [facility]  MRI 6/2021 [facility]. Current Outpatient Medications:     methylPREDNISolone (MEDROL, BRITTANI,) 4 MG tablet, Take by mouth.  6 po day one 5 po day 2 4 po day 3 3 po day 4 2 po day 5 1 po day 6., Disp: 1 kit, Rfl: 0    ergocalciferol (ERGOCALCIFEROL) 1.25 MG (49976 UT) capsule, Take 5,000 Units by mouth daily, Disp: , Rfl:     nortriptyline (PAMELOR) 25 MG capsule, , Disp: , Rfl:     vitamin D3 (CHOLECALCIFEROL) 25 MCG (1000 UT) TABS tablet, Take 1 tablet by mouth daily, Disp: 30 tablet, Rfl: 5    tiZANidine (ZANAFLEX) 2 MG tablet, TAKE ONE TABLET BY MOUTH EVERY 8 HOURS AS NEEDED FOR PAIN, Disp: 45 tablet, Rfl: 1    acyclovir (ZOVIRAX) 400 MG tablet, TAKE ONE TABLET BY MOUTH THREE TIMES A DAY FOR 15 DAYS, Disp: 45 tablet, Rfl: 0    atovaquone-proguanil (MALARONE) 250-100 MG per tablet, Take 1 tablet by mouth daily Take 1 tablet by mouth daily start 2 days prior to leaving and 7 days after returning, Disp: 30 tablet, Rfl: 0    butalbital-acetaminophen-caffeine (FIORICET, ESGIC) -40 MG per tablet, Take 1 tablet by mouth every 4 hours as needed for

## 2023-03-29 NOTE — PLAN OF CARE
(E03.9)  []Hyperthyroid Gastrointestinal conditions   []Constipation (L81.38)   Metabolic conditions   []Morbid obesity (E66.01)  []Diabetes type 1(E10.65) or 2 (E11.65)   []Neuropathy (G60.9)     Cardio/Pulmonary conditions   []Asthma (J45)  []Coughing   []COPD (J44.9)  []CHF  []A-fib   Psychological Disorders  []Anxiety (F41.9)  []Depression (F32.9)   []Bipolar  []Other:   Developmental Disorders  []Autism (F84.0)  []CP (G80)  []Down Syndrome (Q90.9)  []Developmental delay     Neurological conditions  []Prior Stroke (I69.30)  []Parkinson's (G20)  []Encephalopathy (G93.40)  []MS (G35)  []Post-polio (G14)  []SCI  []TBI  []ALS Other conditions  []Fibromyalgia (M79.7)  []Vertigo  []Syncope  []Kidney Failure  []Cancer      []currently undergoing                treatment  []Pregnancy  []Incontinence   Prior surgeries  []involved limb  []previous spinal surgery  [] section birth  []hysterectomy  []bowel / bladder surgery  []other relevant surgeries   []Other:              Barriers to/and or personal factors that will affect rehab potential:              []Age  []Sex   []Smoker              []Motivation/Lack of Motivation                        []Co-Morbidities              []Cognitive Function, education/learning barriers              []Environmental, home barriers              [x]profession/work barriers  []past PT/medical experience  []other:  Justification:     Falls Risk Assessment (30 days):   [x] Falls Risk assessed and no intervention required.   [] Falls Risk assessed and Patient requires intervention due to being higher risk   TUG score (>12s at risk):     [] Falls education provided, including     ASSESSMENT:    Functional Impairments:     [x]Noted cervical/thoracic/GHJ joint hypomobility   []Noted cervical/thoracic/GHJ joint hypermobility   []Decreased cervical/UE functional ROM   []Noted Headache pain aggravated by neck movements with/without dizziness   []Abnormal

## 2023-03-31 ENCOUNTER — HOSPITAL ENCOUNTER (OUTPATIENT)
Dept: PHYSICAL THERAPY | Age: 43
Setting detail: THERAPIES SERIES
Discharge: HOME | End: 2023-03-31
Payer: COMMERCIAL

## 2023-03-31 PROCEDURE — 20560 NDL INSJ W/O NJX 1 OR 2 MUSC: CPT

## 2023-03-31 PROCEDURE — 97140 MANUAL THERAPY 1/> REGIONS: CPT

## 2023-03-31 PROCEDURE — 97112 NEUROMUSCULAR REEDUCATION: CPT

## 2023-03-31 NOTE — FLOWSHEET NOTE
ROM:  Date Cervical Flex Cervical Ext Cervical L SB Cervical R SB Cervical L Rot Cervical R Rot   Eval 3/29 60 60 40 38 65 72              Strength:  Date Shoulder flexion Shoulder abduction Shoulder IR Shoulder  ER Bicep Scap   Eval 3/29 4+/5 4+/5 4+/5 4/5 5/5 4+/5                RESTRICTIONS/PRECAUTIONS:     Exercises/Interventions:   Therapeutic Ex (45916)  Min: 7 Resistance/Repetitions Notes   Pec doorway stretch 2x 30 sec holds         Supine horizontal abd with band Orange 10x    Seated no monies 20x    Tband row             [employer] ER Add  Add  add         Therapeutic Activity (34097) Min: 0     Pt education                    NMR re-education (75148) Min:10     Post shoulder rolls 10x    Supine cervical retraction 10x ; 5 sec holds    Seated chin tucks 10x ; 5 sec holds     Manual Intervention (94170)  Min: 20     Cerv mobs/manip Grade 3 sideglides R cspine    CT manip     STM R UT/post RTC X10 mins    Manual cervical traction Intermittent     Manual UT stretch 3x 1 min R         Modalities  Min: 20     Dry Needling To R UT- see below for details    CP after exercises X10 mins     Spoke with MATEO Dennis  regarding the use of Dry Needling     Dry needling manual therapy: consisted on the placement of 3 needles in the following muscles:  R UT.  A 40 mm needle was inserted, piston, rotated, and coned to produce intramuscular mobilization. These techniques were used to restore functional range of motion, reduce muscle spasm and induce healing in the corresponding musculature. (61708)  Clean Technique was utilized today while applying Dry needling treatment. The treatment sites where cleaned with 70% solution of  isopropyl alcohol . The PT washed their hands and utilized treatment gloves along with hand  prior to inserting the needles. All needles where removed and discarded in the appropriate sharps container.   MD has given verbal and/or written approval for this

## 2023-04-05 ENCOUNTER — HOSPITAL ENCOUNTER (OUTPATIENT)
Dept: PHYSICAL THERAPY | Age: 43
Setting detail: THERAPIES SERIES
Discharge: HOME | End: 2023-04-05
Payer: COMMERCIAL

## 2023-04-05 PROCEDURE — 97110 THERAPEUTIC EXERCISES: CPT

## 2023-04-05 PROCEDURE — 97140 MANUAL THERAPY 1/> REGIONS: CPT

## 2023-04-05 NOTE — FLOWSHEET NOTE
with 70% solution of  isopropyl alcohol . The PT washed their hands and utilized treatment gloves along with hand  prior to inserting the needles. All needles where removed and discarded in the appropriate sharps container. MD has given verbal and/or written approval for this treatment. Other Therapeutic Activities:  Pt was educated on PT POC, Diagnosis, Prognosis, pathomechanics as well as frequency and duration of scheduling future physical therapy appointments. Time was also taken on this day to answer all patient questions and participation in PT. Reviewed appointment policy in detail with patient and patient verbalized understanding. Home Exercise Program:Patient was instructed in the following for HEP:  Access Code: [email or web address]  Exercises  - Supine Cervical Retraction with Towel  - 2 x daily - 7 x weekly - 1 sets - 10 reps - 5 secs  hold  - Seated Scapular Retraction  - 3 x daily - 7 x weekly - 2 sets - 10 reps  - Seated Backward Shoulder Rolls  - 3 x daily - 7 x weekly - 2 sets - 10 reps  - Doorway Pec Stretch at 90 Degrees Abduction  - 2 x daily - 7 x weekly - 1 sets - 2 reps - 30 hold . Patient verbalized/demonstrated understanding and was issued written handout. Therapeutic Exercise and NMR EXR  [x] (10131) Provided verbal/tactile cueing for activities related to strengthening, flexibility, endurance, ROM  for improvements in cervical, postural, scapular, scapulothoracic and UE control with self care, reaching, carrying, lifting, house/yardwork, driving/computer work. [x] (19466) Provided verbal/tactile cueing for activities related to improving balance, coordination, kinesthetic sense, posture, motor skill, proprioception  to assist with cervical, scapular, scapulothoracic and UE control with self care, reaching, carrying, lifting, house/yardwork, driving/computer work.     Therapeutic Activities:    [] (47019 or ) Provided verbal/tactile cueing for activities related to

## 2023-04-06 ENCOUNTER — HOSPITAL ENCOUNTER (OUTPATIENT)
Dept: ULTRASOUND IMAGING | Age: 43
Discharge: HOME | End: 2023-04-06
Payer: COMMERCIAL

## 2023-04-06 ENCOUNTER — HOSPITAL ENCOUNTER (OUTPATIENT)
Dept: WOMENS IMAGING | Age: 43
Discharge: HOME | End: 2023-04-06
Payer: COMMERCIAL

## 2023-04-06 DIAGNOSIS — N64.52 NIPPLE DISCHARGE IN FEMALE: ICD-10-CM

## 2023-04-06 PROCEDURE — 76642 ULTRASOUND BREAST LIMITED: CPT

## 2023-04-06 PROCEDURE — G0279 TOMOSYNTHESIS, MAMMO: HCPCS

## 2023-04-07 ENCOUNTER — HOSPITAL ENCOUNTER (OUTPATIENT)
Dept: PHYSICAL THERAPY | Age: 43
Setting detail: THERAPIES SERIES
Discharge: HOME | End: 2023-04-07
Payer: COMMERCIAL

## 2023-04-07 PROCEDURE — 97110 THERAPEUTIC EXERCISES: CPT

## 2023-04-07 PROCEDURE — 97140 MANUAL THERAPY 1/> REGIONS: CPT

## 2023-04-07 PROCEDURE — 20560 NDL INSJ W/O NJX 1 OR 2 MUSC: CPT

## 2023-04-07 NOTE — FLOWSHEET NOTE
needle 1 or 2 Muscles (03423)  [] NMR (97240) x     [] Dry needle 3+ Muscles (89630)  [x] Manual (03181) x     [] Ultrasound (49117) x  [] TA (88718) x     [] Mech Traction (72560)  [] ES(attended) (04755)     [] ES (un) (41910):   [] Vasopump (69070) [] Ionto (71125)   [] Other:    GOALS:  Patient stated goal: \"decrease my pain\"  [] Progressing: [] Met: [] Not Met: [] Adjusted     Therapist goals for Patient:   Short Term Goals: To be achieved in: 2 weeks  1. Independent in HEP and progression per patient tolerance, in order to prevent re-injury. [] Progressing: [] Met: [] Not Met: [] Adjusted  2. Patient will have a decrease in pain by 40% to facilitate improvement in movement, function, and ADLs as indicated by Functional Deficits. [] Progressing: [] Met: [] Not Met: [] Adjusted     Long Term Goals: To be achieved in: at discharge  1. Decreased NDI functional outcome score  to less than 20% disability to assist with reaching prior level of function. [] Progressing: [] Met: [] Not Met: [] Adjusted  2. Patient will demonstrate increased cervical AROM to [facility] of cervical/thoracic spine to allow for proper joint functioning as indicated by patients Functional Deficits. [] Progressing: [] Met: [] Not Met: [] Adjusted  3. Patient will demonstrate an increase in postural awareness and control and scapular strength 4/5 to allow for proper functional mobility as indicated by patients Functional Deficits. [] Progressing: [] Met: [] Not Met: [] Adjusted  4. Patient will return to sleeping at night without increased symptoms or restriction. [] Progressing: [] Met: [] Not Met: [] Adjusted  5. Patient will report 50% increased ease with work duties. [] Progressing: [] Met: [] Not Met: [] Adjusted         ASSESSMENT:  Pt with good tolerance to addition of DN to R posterior RTC. Twitch responses noted in all muscles. Progressed scapular and cuff strengthening without issues.       Treatment/Activity Tolerance:  [x]

## 2023-04-10 ENCOUNTER — APPOINTMENT (OUTPATIENT)
Dept: PHYSICAL THERAPY | Age: 43
End: 2023-04-10
Payer: COMMERCIAL

## 2023-04-19 ENCOUNTER — HOSPITAL ENCOUNTER (OUTPATIENT)
Dept: PHYSICAL THERAPY | Age: 43
Setting detail: THERAPIES SERIES
Discharge: HOME | End: 2023-04-19
Payer: COMMERCIAL

## 2023-04-19 PROCEDURE — 20561 NDL INSJ W/O NJX 3+ MUSC: CPT

## 2023-04-19 PROCEDURE — 97140 MANUAL THERAPY 1/> REGIONS: CPT

## 2023-04-19 PROCEDURE — 97110 THERAPEUTIC EXERCISES: CPT

## 2023-04-19 NOTE — FLOWSHEET NOTE
[facility], [facility]  [address] [facility] [place], [facility]  Phone: [phone]   Fax:     [phone]    Physical Therapy Treatment Note/ Progress Report:     Date:  2023    Patient Name:  Brittaney Hernández    :  1980  MRN: [id number]    Pertinent Medical History: Additional Pertinent Hx: HA    Medical/Treatment Diagnosis Information:  Medical Diagnosis: Cervical radicular pain [M54.12]  Treatment Diagnosis: Decreased mobility, postural strength    Insurance/Certification information:  [facility]  Physician Information:  MATEO Bush  Plan of care signed (Y/N): sent for cosign 3/29 (received)    Date of Patient follow up with Physician:      Progress Report: []  Yes  [x]  No     Date Range for reporting period:  Beginning: 3/29/2023  Ending:     Progress report due (10 Rx/or 30 days whichever is less):      Recertification due (POC duration/ or 90 days whichever is less): 23     Visit # Insurance/POC Allowable Auth Needed    27 PCY  [x]Yes    []No     Functional Outcomes Measure:     Test: NDI  Date Assessed Score   3/29/23 26/50 = 52% disability         Pain level:  3/10     History of Injury: Pt notes she has had neck and shoulder pain for the past 4-5 years. About 3 months ago, she noticed pain started to travel down her arm. Pain is the worse in the morning. After she gets up moving a little her pain subsides some. Pt gets some relief from at home with massager and TENS unit at home but it does not last.  Pt had Xrays and EMG which were normal.  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 Pain can be aggravated by work duties. Patient sleeps with 2 pillows. SUBJECTIVE:  3/31: Did ok after eval.  23 reports doing a little  better. C/o of R trap/ upper arm.  Worse in am.   23: Pt notes she felt better

## 2023-04-21 ENCOUNTER — HOSPITAL ENCOUNTER (OUTPATIENT)
Dept: PHYSICAL THERAPY | Age: 43
Setting detail: THERAPIES SERIES
Discharge: HOME | End: 2023-04-21
Payer: COMMERCIAL

## 2023-04-21 PROCEDURE — 20561 NDL INSJ W/O NJX 3+ MUSC: CPT

## 2023-04-21 PROCEDURE — 97112 NEUROMUSCULAR REEDUCATION: CPT

## 2023-04-21 PROCEDURE — 97140 MANUAL THERAPY 1/> REGIONS: CPT

## 2023-04-21 PROCEDURE — 97110 THERAPEUTIC EXERCISES: CPT

## 2023-04-21 NOTE — PLAN OF CARE
carrying, lifting, house/yardwork, driving/computer work      Manual Treatments:  PROM / STM / Oscillations-Mobs:  G-I, II, III, IV (PA's, Inf., Post.)  [x] (81577) Provided manual therapy to mobilize soft tissue/joints of cervical/CT, scapular GHJ and UE for the purpose of decreasing headache, modulating pain, promoting relaxation,  increasing ROM, reducing/eliminating soft tissue swelling/inflammation/restriction, improving soft tissue extensibility and allowing for proper ROM for normal function with self care, reaching, carrying, lifting, house/yardwork, driving/computer work      Charges:  Timed Code Treatment Minutes: 58   Total Treatment Minutes: 58     [] EVAL (LOW) 98086 (typically 20 minutes face-to-face)  [] EVAL (MOD) 65434 (typically 30 minutes face-to-face)  [] EVAL (HIGH) 47360 (typically 45 minutes face-to-face)  [] RE-EVAL     [x] IX(89192) x     [] Dry needle 1 or 2 Muscles (78526)  [x] NMR (38104) x     [x] Dry needle 3+ Muscles (10273)  [x] Manual (32628) x     [] Ultrasound (28171) x  [] TA (56050) x     [] Mech Traction (35501)  [] ES(attended) (59959)     [] ES (un) (22 617506):   [] Vasopump (25059) [] Ionto (10419)   [] Other:    GOALS:  Patient stated goal: \"decrease my pain\"  [x] Progressing: [] Met: [] Not Met: [] Adjusted     Therapist goals for Patient:   Short Term Goals: To be achieved in: 2 weeks  1. Independent in HEP and progression per patient tolerance, in order to prevent re-injury. [] Progressing: [x] Met: [] Not Met: [] Adjusted  2. Patient will have a decrease in pain by 40% to facilitate improvement in movement, function, and ADLs as indicated by Functional Deficits. [] Progressing: [x] Met: [] Not Met: [] Adjusted     Long Term Goals: To be achieved in: at discharge  1. Decreased NDI functional outcome score  to less than 20% disability to assist with reaching prior level of function. [] Progressing: [x] Met: [] Not Met: [] Adjusted  2.  Patient will demonstrate increased

## 2023-04-26 ENCOUNTER — HOSPITAL ENCOUNTER (OUTPATIENT)
Dept: PHYSICAL THERAPY | Age: 43
Setting detail: THERAPIES SERIES
Discharge: HOME | End: 2023-04-26
Payer: COMMERCIAL

## 2023-04-26 PROCEDURE — 97140 MANUAL THERAPY 1/> REGIONS: CPT

## 2023-04-26 PROCEDURE — 97110 THERAPEUTIC EXERCISES: CPT

## 2023-04-26 PROCEDURE — 97112 NEUROMUSCULAR REEDUCATION: CPT

## 2023-04-26 PROCEDURE — 20561 NDL INSJ W/O NJX 3+ MUSC: CPT

## 2023-04-26 NOTE — FLOWSHEET NOTE
tolerated treatment well [] Patient limited by fatique  [] Patient limited by pain  [] Patient limited by other medical complications  [] Other:     Overall Progression Towards Functional goals/ Treatment Progress Update:  [] Patient is progressing as expected towards functional goals listed. [] Progression is slowed due to complexities/Impairments listed. [] Progression has been slowed due to co-morbidities. [x] Plan just implemented, too soon to assess goals progression <30days   [] Goals require adjustment due to lack of progress  [] Patient is not progressing as expected and requires additional follow up with physician  [] Other    Prognosis for POC: [x] Good [] Fair  [] Poor    Patient requires continued skilled intervention: [x] Yes  [] No    PLAN:   [x] Continue per plan of care [] Alter current plan (see comments)  [] Plan of care initiated [] Hold pending MD visit [] Discharge    Electronically signed by: Maggie Mcpherson PT  , OMT-C,  [id number]      Note: If patient does not return for scheduled/recommended follow up visits, this note will serve as a discharge from care along with the most recent update on progress.

## 2023-04-28 ENCOUNTER — HOSPITAL ENCOUNTER (OUTPATIENT)
Dept: PHYSICAL THERAPY | Age: 43
Setting detail: THERAPIES SERIES
Discharge: HOME | End: 2023-04-28
Payer: COMMERCIAL

## 2023-04-28 PROCEDURE — 20561 NDL INSJ W/O NJX 3+ MUSC: CPT

## 2023-04-28 PROCEDURE — 97140 MANUAL THERAPY 1/> REGIONS: CPT

## 2023-04-28 PROCEDURE — 97110 THERAPEUTIC EXERCISES: CPT

## 2023-04-28 NOTE — FLOWSHEET NOTE
[facility], [facility]  [address] [facility] [place], [facility]  Phone: [phone]   Fax:     [phone]      Physical Therapy Treatment Note/ Progress Report:     Date:  2023    Patient Name:  Bj Iraheta    :  1980  MRN: [id number]    Pertinent Medical History: Additional Pertinent Hx: HA    Medical/Treatment Diagnosis Information:  Medical Diagnosis: Cervical radicular pain [M54.12]  Treatment Diagnosis: Decreased mobility, postural strength    Insurance/Certification information:  PT Insurance Information: [facility]  Physician Information:  MATEO Viramontes  Plan of care signed (Y/N): sent for cosign 3/29 (received), PN sent  (received)    Date of Patient follow up with Physician:      Progress Report: []  Yes  [x]  No     Date Range for reporting period:  Beginning: 3/29/2023  Endin23    Progress report due (10 Rx/or 30 days whichever is less):     Recertification due (POC duration/ or 90 days whichever is less): 23    Visit # Insurance/POC Allowable Auth Needed   10 / 16 30 PCY   Approved for 96 units until 23; codes [phone] [phone], 67348, 43658, 08217, 17889 [x]Yes    []No     Functional Outcomes Measure:     Test: NDI  Date Assessed Score   3/29/23 26/50 = 52% disability   23 8/50 = 16% disability     Pain level:  310     History of Injury: Pt notes she has had neck and shoulder pain for the past 4-5 years. About 3 months ago, she noticed pain started to travel down her arm. Pain is the worse in the morning. After she gets up moving a little her pain subsides some. Pt gets some relief from at home with massager and TENS unit at home but it does not last.  Pt had Xrays and EMG which were normal.  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 Pain can be aggravated by work duties. Patient sleeps with 2 pillows.      SUBJECTIVE:

## 2023-05-03 ENCOUNTER — HOSPITAL ENCOUNTER (OUTPATIENT)
Dept: PHYSICAL THERAPY | Age: 43
Setting detail: THERAPIES SERIES
Discharge: HOME | End: 2023-05-03
Payer: COMMERCIAL

## 2023-05-03 PROCEDURE — 97140 MANUAL THERAPY 1/> REGIONS: CPT

## 2023-05-03 PROCEDURE — 97110 THERAPEUTIC EXERCISES: CPT

## 2023-05-03 PROCEDURE — 20560 NDL INSJ W/O NJX 1 OR 2 MUSC: CPT

## 2023-05-03 NOTE — FLOWSHEET NOTE
[facility], [facility]  [address] [facility] [place], [facility]  Phone: [phone]   Fax:     [phone]      Physical Therapy Treatment Note/ Progress Report:     Date:  2023    Patient Name:  Mahesh Seo    :  1980  MRN: [id number]    Pertinent Medical History: Additional Pertinent Hx: HA    Medical/Treatment Diagnosis Information:  Medical Diagnosis: Cervical radicular pain [M54.12]  Treatment Diagnosis: Decreased mobility, postural strength    Insurance/Certification information:  PT Insurance Information: [facility]  Physician Information:  MATEO Crain  Plan of care signed (Y/N): sent for cosign 3/29 (received), PN sent  (received)    Date of Patient follow up with Physician:      Progress Report: []  Yes  [x]  No     Date Range for reporting period:  Beginning: 3/29/2023  Endin23    Progress report due (10 Rx/or 30 days whichever is less): 3/88/48    Recertification due (POC duration/ or 90 days whichever is less): 23    Visit # Insurance/POC Allowable Auth Needed    LVA   Approved for 96 units until 23; codes [phone]) 0513-4054, 56583, 26188, 73581, 10501 [x]Yes    []No     Functional Outcomes Measure:     Test: NDI  Date Assessed Score   3/29/23 26/50 = 52% disability   23 8/50 = 16% disability     Pain level:  310     History of Injury: Pt notes she has had neck and shoulder pain for the past 4-5 years. About 3 months ago, she noticed pain started to travel down her arm. Pain is the worse in the morning. After she gets up moving a little her pain subsides some. Pt gets some relief from at home with massager and TENS unit at home but it does not last.  Pt had Xrays and EMG which were normal.  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 Pain can be aggravated by work duties. Patient sleeps with 2 pillows.      SUBJECTIVE:

## 2023-05-05 ENCOUNTER — HOSPITAL ENCOUNTER (OUTPATIENT)
Dept: PHYSICAL THERAPY | Age: 43
Setting detail: THERAPIES SERIES
Discharge: HOME | End: 2023-05-05
Payer: COMMERCIAL

## 2023-05-05 PROCEDURE — 20560 NDL INSJ W/O NJX 1 OR 2 MUSC: CPT

## 2023-05-05 PROCEDURE — 97140 MANUAL THERAPY 1/> REGIONS: CPT

## 2023-05-05 NOTE — FLOWSHEET NOTE
[facility], [facility]  [address] [facility] [place], [facility]  Phone: [phone]   Fax:     [phone]      Physical Therapy Treatment Note/ Progress Report:     Date:  2023    Patient Name:  Roxane Andujar    :  1980  MRN: [id number]    Pertinent Medical History: Additional Pertinent Hx: HA    Medical/Treatment Diagnosis Information:  Medical Diagnosis: Cervical radicular pain [M54.12]  Treatment Diagnosis: Decreased mobility, postural strength    Insurance/Certification information:  [facility]  Physician Information:  MATEO Carrera  Plan of care signed (Y/N): sent for cosign 3/29 (received), PN sent  (received)    Date of Patient follow up with Physician:      Progress Report: []  Yes  [x]  No     Date Range for reporting period:  Beginning: 3/29/2023  Endin23    Progress report due (10 Rx/or 30 days whichever is less):     Recertification due (POC duration/ or 90 days whichever is less): 23    Visit # Insurance/POC Allowable Auth Needed    30 PCY   Approved for 96 units until 23; codes [phone], [phone], 84371, 24366, 44485 [x]Yes    []No     Functional Outcomes Measure:     Test: NDI  Date Assessed Score   3/29/23 26/50 = 52% disability   23 850 = 16% disability     Pain level:  310     History of Injury: Pt notes she has had neck and shoulder pain for the past 4-5 years. About 3 months ago, she noticed pain started to travel down her arm. Pain is the worse in the morning. After she gets up moving a little her pain subsides some. Pt gets some relief from at home with massager and TENS unit at home but it does not last.  Pt had Xrays and EMG which were normal.  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 Pain can be aggravated by work duties. Patient sleeps with 2 pillows.      SUBJECTIVE:

## 2023-05-12 ENCOUNTER — HOSPITAL ENCOUNTER (OUTPATIENT)
Dept: PHYSICAL THERAPY | Age: 43
Setting detail: THERAPIES SERIES
Discharge: HOME | End: 2023-05-12
Payer: COMMERCIAL

## 2023-05-12 PROCEDURE — 97110 THERAPEUTIC EXERCISES: CPT

## 2023-05-12 PROCEDURE — 20561 NDL INSJ W/O NJX 3+ MUSC: CPT

## 2023-05-12 PROCEDURE — 97140 MANUAL THERAPY 1/> REGIONS: CPT

## 2023-05-12 NOTE — FLOWSHEET NOTE
[facility], [facility]  [address] [facility], [facility]  Phone: [phone]   Fax:     [phone]      Physical Therapy Treatment Note/ Progress Report:     Date:  2023    Patient Name:  Ronel Rain    :  1980  MRN: [id number]    Pertinent Medical History: Additional Pertinent Hx: HA    Medical/Treatment Diagnosis Information:  Medical Diagnosis: Cervical radicular pain [M54.12]  Treatment Diagnosis: Decreased mobility, postural strength    Insurance/Certification information:  PT Insurance Information: [facility]  Physician Information:  MATEO Wolf  Plan of care signed (Y/N): sent for cosign 3/29 (received), PN sent  (received)    Date of Patient follow up with Physician:      Progress Report: []  Yes  [x]  No     Date Range for reporting period:  Beginning: 3/29/2023  Endin23    Progress report due (10 Rx/or 30 days whichever is less):     Recertification due (POC duration/ or 90 days whichever is less): 23    Visit # Insurance/POC Allowable Auth Needed    [facility]   Approved for 96 units until 23; codes [phone]) 9977-9918, 41433, 07558, 94278, 83423 [x]Yes    []No     Functional Outcomes Measure:     Test: NDI  Date Assessed Score   3/29/23 26/50 = 52% disability   23 8/50 = 16% disability     Pain level:  310     History of Injury: Pt notes she has had neck and shoulder pain for the past 4-5 years. About 3 months ago, she noticed pain started to travel down her arm. Pain is the worse in the morning. After she gets up moving a little her pain subsides some. Pt gets some relief from at home with massager and TENS unit at home but it does not last.  Pt had Xrays and EMG which were normal.  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 Pain can be aggravated by work duties. Patient sleeps with 2 pillows.      SUBJECTIVE:

## 2023-05-24 ENCOUNTER — HOSPITAL ENCOUNTER (OUTPATIENT)
Dept: PHYSICAL THERAPY | Age: 43
Setting detail: THERAPIES SERIES
Discharge: HOME | End: 2023-05-24
Payer: COMMERCIAL

## 2023-05-24 PROCEDURE — 97110 THERAPEUTIC EXERCISES: CPT

## 2023-05-24 PROCEDURE — 97140 MANUAL THERAPY 1/> REGIONS: CPT

## 2023-05-24 PROCEDURE — 20560 NDL INSJ W/O NJX 1 OR 2 MUSC: CPT

## 2023-05-24 NOTE — FLOWSHEET NOTE
[facility], [facility]  [address] [facility] [place], [facility]  Phone: [phone]   Fax:     [phone]      Physical Therapy Treatment Note/ Progress Report:     Date:  2023    Patient Name:  Manpreet Braxton    :  1980  MRN: [id number]    Pertinent Medical History: Additional Pertinent Hx: HA    Medical/Treatment Diagnosis Information:  Medical Diagnosis: Cervical radicular pain [M54.12]  Treatment Diagnosis: Decreased mobility, postural strength    Insurance/Certification information:  PT Insurance Information: [facility]  Physician Information:  MATEO Javed  Plan of care signed (Y/N): sent for cosign 3/29 (received), PN sent  (received)    Date of Patient follow up with Physician:      Progress Report: []  Yes  [x]  No     Date Range for reporting period:  Beginning: 3/29/2023  Endin23    Progress report due (10 Rx/or 30 days whichever is less):     Recertification due (POC duration/ or 90 days whichever is less): 23    Visit # Insurance/POC Allowable Auth Needed    30 PCY   Approved for 96 units until 23; codes (31) 9955-0284, 42015, 33340, 02341, 23944 [x]Yes    []No     Functional Outcomes Measure:     Test: NDI  Date Assessed Score   3/29/23 26/50 = 52% disability   23 8/50 = 16% disability     Pain level:  310     History of Injury: Pt notes she has had neck and shoulder pain for the past 4-5 years. About 3 months ago, she noticed pain started to travel down her arm. Pain is the worse in the morning. After she gets up moving a little her pain subsides some. Pt gets some relief from at home with massager and TENS unit at home but it does not last.  Pt had Xrays and EMG which were normal.  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 Pain can be aggravated by work duties. Patient sleeps with 2 pillows.      SUBJECTIVE:

## 2023-05-26 ENCOUNTER — HOSPITAL ENCOUNTER (OUTPATIENT)
Dept: PHYSICAL THERAPY | Age: 43
Setting detail: THERAPIES SERIES
Discharge: HOME | End: 2023-05-26
Payer: COMMERCIAL

## 2023-05-26 PROCEDURE — 97140 MANUAL THERAPY 1/> REGIONS: CPT

## 2023-05-26 PROCEDURE — 20561 NDL INSJ W/O NJX 3+ MUSC: CPT

## 2023-05-26 NOTE — PLAN OF CARE
[facility], [facility]  [address] [facility]  Phone: [phone]   Fax:     [phone]      Physical Therapy Re-Certification Plan of Care/MD UPDATE      Dear Sophie Bence, PA,    We had the pleasure of treating the following patient for physical therapy services at [address]. A summary of our findings can be found in the updated assessment below. This includes our plan of care. If you have any questions or concerns regarding these findings, please do not hesitate to contact me at the office phone number checked above. Thank you for the referral.     Physician Signature:________________________________Date:__________________  By signing above (or electronic signature), therapists plan is approved by physician    Date Range Of Visits: 3/29/23 to 23  Total Visits to Date: 13  Overall Response to Treatment:   [x]Patient is responding well to treatment and improvement is noted with regards  to goals   []Patient should continue to improve in reasonable time if they continue HEP   []Patient has plateaued and is no longer responding to skilled PT intervention    []Patient is getting worse and would benefit from return to referring MD   []Patient unable to adhere to initial POC   []Other:     ASSESSMENT:  Pt continues to respond well to DN and manual therapy techniques. Pt is making slow steady gains towards rehab goals due to postural demands of work duties. Increased scapular strength noted but lacks endurance. Pt will continue to benefit from skilled care to address flexibility and endurance issues for ease with work duties. Recommendation:    [x]Continue PT 2x / wk for 4 weeks.     []Hold PT, pending MD visit      Physical Therapy Treatment Note/ Progress Report:     Date:  2023    Patient Name:  Augustina Rogers    :  1980  MRN:

## 2023-05-31 ENCOUNTER — HOSPITAL ENCOUNTER (OUTPATIENT)
Dept: PHYSICAL THERAPY | Age: 43
Setting detail: THERAPIES SERIES
Discharge: HOME | End: 2023-05-31
Payer: COMMERCIAL

## 2023-05-31 PROCEDURE — 20561 NDL INSJ W/O NJX 3+ MUSC: CPT

## 2023-05-31 PROCEDURE — 97140 MANUAL THERAPY 1/> REGIONS: CPT

## 2023-05-31 NOTE — FLOWSHEET NOTE
decreasing headache, modulating pain, promoting relaxation,  increasing ROM, reducing/eliminating soft tissue swelling/inflammation/restriction, improving soft tissue extensibility and allowing for proper ROM for normal function with self care, reaching, carrying, lifting, house/yardwork, driving/computer work      Charges:  Timed Code Treatment Minutes: 35   Total Treatment Minutes: 35     [] EVAL (LOW) 01811 (typically 20 minutes face-to-face)  [] EVAL (MOD) 48711 (typically 30 minutes face-to-face)  [] EVAL (HIGH) 15727 (typically 45 minutes face-to-face)  [] RE-EVAL     [] INDRA(65693) x     [] Dry needle 1 or 2 Muscles (04740)  [] NMR (70597) x     [x] Dry needle 3+ Muscles (66982)  [x] Manual (19529) x 1    [] Ultrasound (60741) x  [] TA (12021) x     [] Mech Traction (57697)  [] ES(attended) (73183)     [] ES (un) (32856):   [] Vasopump (47784) [] Ionto (18085)   [] Other:    GOALS:  Patient stated goal: \"decrease my pain\"  [x] Progressing: [] Met: [] Not Met: [] Adjusted     Therapist goals for Patient:   Short Term Goals: To be achieved in: 2 weeks  1. Independent in HEP and progression per patient tolerance, in order to prevent re-injury. [] Progressing: [x] Met: [] Not Met: [] Adjusted  2. Patient will have a decrease in pain by 40% to facilitate improvement in movement, function, and ADLs as indicated by Functional Deficits. [] Progressing: [x] Met: [] Not Met: [] Adjusted     Long Term Goals: To be achieved in: at discharge  1. Decreased NDI functional outcome score  to less than 20% disability to assist with reaching prior level of function. [x] Progressing: [] Met: [] Not Met: [] Adjusted  2. Patient will demonstrate increased cervical AROM to [facility] of cervical/thoracic spine to allow for proper joint functioning as indicated by patients Functional Deficits. [] Progressing: [x] Met: [] Not Met: [] Adjusted  3.  Patient will demonstrate an increase in postural awareness and control and scapular

## 2023-06-02 ENCOUNTER — HOSPITAL ENCOUNTER (OUTPATIENT)
Dept: PHYSICAL THERAPY | Age: 43
Setting detail: THERAPIES SERIES
Discharge: HOME | End: 2023-06-02
Payer: COMMERCIAL

## 2023-06-02 PROCEDURE — 97140 MANUAL THERAPY 1/> REGIONS: CPT

## 2023-06-02 PROCEDURE — 20561 NDL INSJ W/O NJX 3+ MUSC: CPT

## 2023-06-02 NOTE — FLOWSHEET NOTE
[facility], [facility]  [address] [facility], [facility]  Phone: [phone]   Fax:     [phone]      Physical Therapy Treatment Note/ Progress Report:     Date:  2023    Patient Name:  Signe Heimlich    :  1980  MRN: [id number]    Pertinent Medical History: Additional Pertinent Hx: HA    Medical/Treatment Diagnosis Information:  Medical Diagnosis: Cervical radicular pain [M54.12]  Treatment Diagnosis: Decreased mobility, postural strength    Insurance/Certification information:  PT Insurance Information: [facility]  Physician Information:  MATEO Mckenzie  Plan of care signed (Y/N): sent for cosign 3/29 (received), PN sent  (received), PN sent  (received)    Date of Patient follow up with Physician:      Progress Report: [x]  Yes  []  No     Date Range for reporting period:  Beginning: 3/29/2023  Endin23    Progress report due (10 Rx/or 30 days whichever is less):     Recertification due (POC duration/ or 90 days whichever is less): 23    Visit # Insurance/POC Allowable Auth Needed   17        30 PCY   Approved for 96 units until 23; codes [phone], [phone], [id number], 17687, 68640    12 visits approved  to  [x]Yes    []No     Functional Outcomes Measure:     Test: NDI  Date Assessed Score   3/29/23 2650 = 52% disability   23 850 = 16% disability   23 1150 =  22% disability     Pain level:  3/10     History of Injury: Pt notes she has had neck and shoulder pain for the past 4-5 years. About 3 months ago, she noticed pain started to travel down her arm. Pain is the worse in the morning. After she gets up moving a little her pain subsides some.   Pt gets some relief from at home with massager and TENS unit at home but it does not last.  Pt had Xrays and EMG which were normal.  Pt works as a  where she can be doing nails constantly for

## 2023-06-07 ENCOUNTER — HOSPITAL ENCOUNTER (OUTPATIENT)
Dept: PHYSICAL THERAPY | Age: 43
Setting detail: THERAPIES SERIES
Discharge: HOME | End: 2023-06-07
Payer: COMMERCIAL

## 2023-06-07 PROCEDURE — 20561 NDL INSJ W/O NJX 3+ MUSC: CPT

## 2023-06-07 PROCEDURE — 97140 MANUAL THERAPY 1/> REGIONS: CPT

## 2023-06-07 NOTE — FLOWSHEET NOTE
[facility], [facility]  [address] [facility], [facility]  Phone: [phone]   Fax:     [phone]      Physical Therapy Treatment Note/ Progress Report:     Date:  2023    Patient Name:  Cyndi Weller    :  1980  MRN: [id number]    Pertinent Medical History: Additional Pertinent Hx: HA    Medical/Treatment Diagnosis Information:  Medical Diagnosis: Cervical radicular pain [M54.12]  Treatment Diagnosis: Decreased mobility, postural strength    Insurance/Certification information:  PT Insurance Information: [facility]  Physician Information:  MATEO Scott  Plan of care signed (Y/N): sent for cosign 3/29 (received), PN sent  (received), PN sent  (received)    Date of Patient follow up with Physician:      Progress Report: [x]  Yes  []  No     Date Range for reporting period:  Beginning: 3/29/2023  Endin23    Progress report due (10 Rx/or 30 days whichever is less):     Recertification due (POC duration/ or 90 days whichever is less): 23    Visit # Insurance/POC Allowable Auth Needed   18       30 PCY   Approved for 96 units until 23; codes [phone], [address], [id number], 74584, 15755    12 visits approved  to  [x]Yes    []No     Functional Outcomes Measure:     Test: NDI  Date Assessed Score   3/29/23 2650 = 52% disability   23 850 = 16% disability   2350 =  22% disability     Pain level:  3/10     History of Injury: Pt notes she has had neck and shoulder pain for the past 4-5 years. About 3 months ago, she noticed pain started to travel down her arm. Pain is the worse in the morning. After she gets up moving a little her pain subsides some.   Pt gets some relief from at home with massager and TENS unit at home but it does not last.  Pt had Xrays and EMG which were normal.  Pt works as a  where she can be doing nails constantly for

## 2023-06-09 ENCOUNTER — HOSPITAL ENCOUNTER (OUTPATIENT)
Dept: PHYSICAL THERAPY | Age: 43
Setting detail: THERAPIES SERIES
Discharge: HOME | End: 2023-06-09
Payer: COMMERCIAL

## 2023-06-09 PROCEDURE — 20561 NDL INSJ W/O NJX 3+ MUSC: CPT

## 2023-06-09 PROCEDURE — 97140 MANUAL THERAPY 1/> REGIONS: CPT

## 2023-06-09 NOTE — FLOWSHEET NOTE
at 90 Degrees Abduction  - 2 x daily - 7 x weekly - 1 sets - 2 reps - 30 hold . Patient verbalized/demonstrated understanding and was issued written handout. 4/19: updated HEP to include the following;  Access Code: [email or web address]  Exercises  - Tband rows  - 1 x daily - 7 x weekly - 2 sets - 10 reps - green band color  - Tband shldr extension  - 1 x daily - 7 x weekly - 2 sets - 10 reps -  green band color  - Tband shldr ER  - 1 x daily - 7 x weekly - 2 sets - 10 reps - green  band color  - Tband B shldr ER  - 1 x daily - 7 x weekly - 2 sets - 10 reps - red band color  Patient verbalized/demonstrated understanding and was issued written handout. 6/9: Updated HEP to include the following:  Access Code: [email or web address]  Exercises  - Tband shldr ER  - 1 x daily - 7 x weekly - 1 sets - 30 reps - green band color  - Tband shldr ER at 90/90  - 1 x daily - 7 x weekly - 1 sets - 30 reps - green band color  - Shoulder External Rotation Reactive Isometrics  - 1 x daily - 7 x weekly - 1 sets - 15 reps - 10 secs hold - green band color  - Prone Shoulder Row on The Rolf-Khris with Dumbbells  - 1 x daily - 7 x weekly - 1 sets - 30 reps - 5# weight   - Prone Shoulder Horizontal Abduction on Swiss Ball  - 1 x daily - 7 x weekly - 1 sets - 30 reps - 3# weight  - Prone Shoulder Extension on Swiss Ball with Dumbbells  - 1 x daily - 7 x weekly - 1 sets - 30 reps - 5# weight  - Full Plank with Shoulder Taps  - 1 x daily - 7 x weekly - 1 sets - 10 reps  - Standing Full Range Shoulder Flexion with Dumbbells  - 1 x daily - 7 x weekly - 1 sets - 30 reps - 4-5# weight  - Scaption with Dumbbells  - 1 x daily - 7 x weekly - 1 sets - 30 reps - 4-5# weight  Patient verbalized/demonstrated understanding and was issued written handout.       Therapeutic Exercise and NMR EXR  [] (88880) Provided verbal/tactile cueing for activities related to strengthening, flexibility, endurance, ROM  for improvements in cervical, postural, scapular, scapulothoracic and UE

## 2023-06-14 ENCOUNTER — APPOINTMENT (OUTPATIENT)
Dept: PHYSICAL THERAPY | Age: 43
End: 2023-06-14
Payer: COMMERCIAL

## 2023-06-14 NOTE — FLOWSHEET NOTE
[facility], [facility]  [address] [facility], [facility]  Phone: [phone]   Fax:     [phone]      Physical Therapy Treatment Note/ Progress Report:     Date:  2023    Patient Name:  Tianna Rivas    :  1980  MRN: [id number]    Pertinent Medical History: Additional Pertinent Hx: HA    Medical/Treatment Diagnosis Information:  Medical Diagnosis: Cervical radicular pain [M54.12]  Treatment Diagnosis: Decreased mobility, postural strength    Insurance/Certification information:  PT Insurance Information: [facility]  Physician Information:  MATEO Echevarria  Plan of care signed (Y/N): sent for cosign 3/29 (received), PN sent  (received), PN sent  (received)    Date of Patient follow up with Physician:      Progress Report: [x]  Yes  []  No     Date Range for reporting period:  Beginning: 3/29/2023  Endin23    Progress report due (10 Rx/or 30 days whichever is less):     Recertification due (POC duration/ or 90 days whichever is less): 23    Visit # Insurance/POC Allowable Auth Needed   20        30 PCY   Approved for 96 units until 23; codes [phone], [id number], [id number], 57101, 92095    12 visits approved  to  [x]Yes    []No     Functional Outcomes Measure:     Test: NDI  Date Assessed Score   3/29/23 2650 = 52% disability   23 850 = 16% disability   23 1150 =  22% disability     Pain level:  3/10     History of Injury: Pt notes she has had neck and shoulder pain for the past 4-5 years. About 3 months ago, she noticed pain started to travel down her arm. Pain is the worse in the morning. After she gets up moving a little her pain subsides some.   Pt gets some relief from at home with massager and TENS unit at home but it does not last.  Pt had Xrays and EMG which were normal.  Pt works as a  where she can be doing nails constantly for

## 2023-06-16 ENCOUNTER — APPOINTMENT (OUTPATIENT)
Dept: PHYSICAL THERAPY | Age: 43
End: 2023-06-16
Payer: COMMERCIAL

## 2023-06-16 NOTE — PLAN OF CARE
- 1 x daily - 7 x weekly - 2 sets - 10 reps - red band color  Patient verbalized/demonstrated understanding and was issued written handout. 6/9: Updated HEP to include the following:  Access Code: [email or web address]  Exercises  - Tband shldr ER  - 1 x daily - 7 x weekly - 1 sets - 30 reps - green band color  - Tband shldr ER at 90/90  - 1 x daily - 7 x weekly - 1 sets - 30 reps - green band color  - Shoulder External Rotation Reactive Isometrics  - 1 x daily - 7 x weekly - 1 sets - 15 reps - 10 secs hold - green band color  - Prone Shoulder Row on The Rolf-Khris with Dumbbells  - 1 x daily - 7 x weekly - 1 sets - 30 reps - 5# weight   - Prone Shoulder Horizontal Abduction on Swiss Ball  - 1 x daily - 7 x weekly - 1 sets - 30 reps - 3# weight  - Prone Shoulder Extension on Swiss Ball with Dumbbells  - 1 x daily - 7 x weekly - 1 sets - 30 reps - 5# weight  - Full Plank with Shoulder Taps  - 1 x daily - 7 x weekly - 1 sets - 10 reps  - Standing Full Range Shoulder Flexion with Dumbbells  - 1 x daily - 7 x weekly - 1 sets - 30 reps - 4-5# weight  - Scaption with Dumbbells  - 1 x daily - 7 x weekly - 1 sets - 30 reps - 4-5# weight  Patient verbalized/demonstrated understanding and was issued written handout. Therapeutic Exercise and NMR EXR  [] (72613) Provided verbal/tactile cueing for activities related to strengthening, flexibility, endurance, ROM  for improvements in cervical, postural, scapular, scapulothoracic and UE control with self care, reaching, carrying, lifting, house/yardwork, driving/computer work.    [] (83124) Provided verbal/tactile cueing for activities related to improving balance, coordination, kinesthetic sense, posture, motor skill, proprioception  to assist with cervical, scapular, scapulothoracic and UE control with self care, reaching, carrying, lifting, house/yardwork, driving/computer work.     Therapeutic Activities:    [] (29979 or ) Provided verbal/tactile cueing for activities related to

## 2023-06-20 ENCOUNTER — OFFICE VISIT (OUTPATIENT)
Dept: ORTHOPEDIC SURGERY | Age: 43
End: 2023-06-20
Payer: COMMERCIAL

## 2023-06-20 VITALS — BODY MASS INDEX: 24.54 KG/M2 | HEIGHT: 60 IN | WEIGHT: 125 LBS

## 2023-06-20 DIAGNOSIS — M75.81 RIGHT ROTATOR CUFF TENDONITIS: ICD-10-CM

## 2023-06-20 DIAGNOSIS — M54.12 CERVICAL RADICULAR PAIN: Primary | ICD-10-CM

## 2023-06-20 PROCEDURE — G8427 DOCREV CUR MEDS BY ELIG CLIN: HCPCS | Performed by: PHYSICIAN ASSISTANT

## 2023-06-20 PROCEDURE — 99213 OFFICE O/P EST LOW 20 MIN: CPT | Performed by: PHYSICIAN ASSISTANT

## 2023-06-20 PROCEDURE — 1036F TOBACCO NON-USER: CPT | Performed by: PHYSICIAN ASSISTANT

## 2023-06-20 PROCEDURE — G8420 CALC BMI NORM PARAMETERS: HCPCS | Performed by: PHYSICIAN ASSISTANT

## 2023-06-20 PROCEDURE — 20610 DRAIN/INJ JOINT/BURSA W/O US: CPT | Performed by: PHYSICIAN ASSISTANT

## 2023-06-20 RX ORDER — BUPIVACAINE HYDROCHLORIDE 2.5 MG/ML
2 INJECTION, SOLUTION INFILTRATION; PERINEURAL ONCE
Status: COMPLETED | OUTPATIENT
Start: 2023-06-20 | End: 2023-06-20

## 2023-06-20 RX ORDER — TRIAMCINOLONE ACETONIDE 40 MG/ML
40 INJECTION, SUSPENSION INTRA-ARTICULAR; INTRAMUSCULAR ONCE
Status: COMPLETED | OUTPATIENT
Start: 2023-06-20 | End: 2023-06-20

## 2023-06-20 RX ADMIN — TRIAMCINOLONE ACETONIDE 40 MG: 40 INJECTION, SUSPENSION INTRA-ARTICULAR; INTRAMUSCULAR at 08:51

## 2023-06-20 RX ADMIN — BUPIVACAINE HYDROCHLORIDE 5 MG: 2.5 INJECTION, SOLUTION INFILTRATION; PERINEURAL at 08:50

## 2023-06-20 NOTE — PROGRESS NOTES
Subjective:      was utilized for today's visit. Patient ID: Mark King is a 43 y.o. female who is here for follow up evaluation of right-sided neck pain and right arm pain. She states the cervical pain is better as is her right upper extremity pain but she still has persistent right shoulder pain with repetitive activity. She does nails at a nail salon. Most of the motion is down with her left arm but she does use her right arm to hold the clients hand in  position while she is doing her nails with her left hand. She now reports global right shoulder pain with lifting and reaching. Review of Systems:   Constitutional: denies fever, chills, weight loss. MSK: denies pain in other joints, muscle aches. Neurological: denies numbness, tingling, weakness. Past Medical History:   Diagnosis Date    Headache        Family History   Problem Relation Age of Onset    High Cholesterol Mother     Ovarian Cancer Maternal Aunt        Past Surgical History:   Procedure Laterality Date    CARPAL TUNNEL RELEASE  2010     SECTION  ,,       Social History     Occupational History    Occupation: nail salon   Tobacco Use    Smoking status: Never    Smokeless tobacco: Never   Vaping Use    Vaping Use: Never used   Substance and Sexual Activity    Alcohol use: No     Alcohol/week: 0.0 standard drinks    Drug use: No    Sexual activity: Yes     Partners: Male       Current Outpatient Medications   Medication Sig Dispense Refill    methylPREDNISolone (MEDROL, BRITTANI,) 4 MG tablet Take by mouth.  6 po day one 5 po day 2 4 po day 3 3 po day 4 2 po day 5 1 po day 6. 1 kit 0    ergocalciferol (ERGOCALCIFEROL) 1.25 MG (41246 UT) capsule Take 5,000 Units by mouth daily      nortriptyline (PAMELOR) 25 MG capsule       vitamin D3 (CHOLECALCIFEROL) 25 MCG (1000 UT) TABS tablet Take 1 tablet by mouth daily 30 tablet 5    tiZANidine (ZANAFLEX) 2 MG tablet TAKE ONE TABLET BY MOUTH EVERY 8 HOURS AS

## 2023-08-01 ENCOUNTER — OFFICE VISIT (OUTPATIENT)
Dept: ORTHOPEDIC SURGERY | Age: 43
End: 2023-08-01
Payer: COMMERCIAL

## 2023-08-01 VITALS — BODY MASS INDEX: 24.54 KG/M2 | RESPIRATION RATE: 16 BRPM | WEIGHT: 125 LBS | HEIGHT: 60 IN

## 2023-08-01 DIAGNOSIS — M25.511 RIGHT SHOULDER PAIN, UNSPECIFIED CHRONICITY: Primary | ICD-10-CM

## 2023-08-01 DIAGNOSIS — M75.81 RIGHT ROTATOR CUFF TENDONITIS: ICD-10-CM

## 2023-08-01 PROCEDURE — 1036F TOBACCO NON-USER: CPT | Performed by: PHYSICIAN ASSISTANT

## 2023-08-01 PROCEDURE — G8420 CALC BMI NORM PARAMETERS: HCPCS | Performed by: PHYSICIAN ASSISTANT

## 2023-08-01 PROCEDURE — G8427 DOCREV CUR MEDS BY ELIG CLIN: HCPCS | Performed by: PHYSICIAN ASSISTANT

## 2023-08-01 PROCEDURE — 99213 OFFICE O/P EST LOW 20 MIN: CPT | Performed by: PHYSICIAN ASSISTANT

## 2023-08-01 NOTE — PROGRESS NOTES
Subjective:      Patient does not speak English therefore a  was utilized for today's visit. Patient ID: Kaylin Max is a 43 y.o. female who is here for follow up evaluation of neck pain and right upper arm pain. She states the neck pain, numbness and tingling have been resolved prior to last visit which was on 23. On that visit she underwent a right shoulder subacromial steroid injection. Within 1 week of the injection her right arm pain went away. She was pain-free for about 3 weeks. Her pain has gradually returned. She complains of pain primarily in the right shoulder region and into her deltoid. She has posterior shoulder pain. She denies any significant numbness or tingling in the upper extremity. Review Of Systems:   Review of symptoms was reviewed and is significant for back pain and negative for recent weight loss, fatigue, chills, visual disturbances, blood in stool or urine, recent infection. Past Medical History:   Diagnosis Date    Headache        Family History   Problem Relation Age of Onset    High Cholesterol Mother     Ovarian Cancer Maternal Aunt        Past Surgical History:   Procedure Laterality Date    CARPAL TUNNEL RELEASE  2010     SECTION  ,,       Social History     Occupational History    Occupation: nail salon   Tobacco Use    Smoking status: Never    Smokeless tobacco: Never   Vaping Use    Vaping Use: Never used   Substance and Sexual Activity    Alcohol use: No     Alcohol/week: 0.0 standard drinks    Drug use: No    Sexual activity: Yes     Partners: Male       Current Outpatient Medications   Medication Sig Dispense Refill    methylPREDNISolone (MEDROL, BRITTANI,) 4 MG tablet Take by mouth.  6 po day one 5 po day 2 4 po day 3 3 po day 4 2 po day 5 1 po day 6. 1 kit 0    ergocalciferol (ERGOCALCIFEROL) 1.25 MG (05234 UT) capsule Take 5,000 Units by mouth daily      nortriptyline (PAMELOR) 25 MG capsule       vitamin D3

## 2023-08-08 ENCOUNTER — HOSPITAL ENCOUNTER (OUTPATIENT)
Dept: MRI IMAGING | Age: 43
Discharge: HOME | End: 2023-08-08
Payer: COMMERCIAL

## 2023-08-08 DIAGNOSIS — M75.81 RIGHT ROTATOR CUFF TENDONITIS: ICD-10-CM

## 2023-08-08 PROCEDURE — 73221 MRI JOINT UPR EXTREM W/O DYE: CPT

## 2023-08-21 ENCOUNTER — TELEPHONE (OUTPATIENT)
Dept: ORTHOPEDIC SURGERY | Age: 43
End: 2023-08-21

## 2023-08-21 NOTE — TELEPHONE ENCOUNTER
Gabe Cushing called and spoke with Samantha Solis, recommended she see sports medicine for followup Dominic Denton or Olvin Montenegro

## 2023-08-21 NOTE — TELEPHONE ENCOUNTER
Test Results     Type of Test: MRI  Date of Test:   Location of Test:   Patient Contact Number: [phone]    THE PT WOULD LIKE SOMEONE TO CALL HER WITH THE RESULTS.  YOU CAN SPEAK WITH HER  MAHAN AS WELL.   THEY CAN BE REACHED AT THE ABOVE PHONE #

## 2023-08-28 ENCOUNTER — OFFICE VISIT (OUTPATIENT)
Dept: ORTHOPEDIC SURGERY | Age: 43
End: 2023-08-28

## 2023-08-28 VITALS — BODY MASS INDEX: 24.26 KG/M2 | WEIGHT: 123.6 LBS | RESPIRATION RATE: 16 BRPM | HEIGHT: 60 IN

## 2023-08-28 DIAGNOSIS — M75.81 RIGHT ROTATOR CUFF TENDONITIS: Primary | ICD-10-CM

## 2023-08-28 NOTE — PROGRESS NOTES
postoperative rehabilitation. She has had full opportunity to ask her questions. I have answered them all to her satisfaction. I feel that Ms. Ariel Francis understands our discussion today and she will provide written informed consent for the procedure. Plan for right shoulder arthroscopy, subacromial decompression, possible rotator cuff debridement. The patient will see their PCP for H&P and clearance for surgery. Tootie Kirk. Julia Kaplan MD  Orthopaedic Surgery and Sports Medicine     Disclaimer: This note was generated with use of a verbal recognition program and an attempt was made to check for errors. It is possible that there are still dictated errors within this office note. If so, please bring any significant errors to my attention for an addendum. All efforts were made to ensure that this office note is accurate.

## 2023-08-30 NOTE — PROGRESS NOTES
[address]  time ___0600_________        Surgery time ____0800________    Take the following medications with a sip of water: Follow your MD/Surgeons pre-procedure instructions regarding your medications     Do not eat or drink anything after 12:00 midnight prior to your surgery. This includes water chewing gum, mints and ice chips. You may brush your teeth and gargle the morning of your surgery, but do not swallow the water     Please see your family doctor/pediatrician for a history and physical and/or concerning medications. Bring any test results/reports from your physicians office. If you are under the care of a heart doctor or specialist doctor, please be aware that you may be asked to them for clearance    You may be asked to stop blood thinners such as Coumadin, Plavix, Fragmin, Lovenox, etc., or any anti-inflammatories such as:  Aspirin, Ibuprofen, Advil, Naproxen prior to your surgery. We also ask that you stop any OTC medications such as fish oil, vitamin E, glucosamine, garlic, Multivitamins, COQ 10, etc.    We ask that you do not smoke 24 hours prior to surgery  We ask that you do not  drink any alcoholic beverages 24 hours prior to surgery     You must make arrangements for a responsible adult to take you home after your surgery. For your safety you will not be allowed to leave alone or drive yourself home. Your surgery will be cancelled if you do not have a ride home. Also for your safety, it is strongly suggested that someone stay with you the first 24 hours after your surgery. A parent or legal guardian must accompany a child scheduled for surgery and plan to stay at the hospital until the child is discharged. Please do not bring other children with you. For your comfort, please wear simple loose fitting clothing to the hospital.  Please do not bring valuables.     Do not wear any make-up or nail polish on your fingers or screening and protocol:       * Admitted with diarrhea? [] YES    [x]  NO     *Prior history of C-Diff. In last 3 months? [] YES    [x]  NO     *Antibiotic use in the past 6-8 weeks? [x]  NO    []  YES                 If yes, which ANTIBIOTIC AND REASON______     *Prior hospitalization or nursing home in the last month? []  YES    [x]  NO        SAFETY FIRST. .call before you fall

## 2023-08-30 NOTE — PROGRESS NOTES
[facility] Pre-Admission Testing Electronic Communication Worksheet for OR/ENDO Procedures        Patient: June Garcia    DOS: 9/15/23    Arrival Time: 0600    Surgery Time:0800    Meds to Bed:  [] YES    [x]  NO    Transportation Confirmed: [x] YES    []  NO Tan Au(spouse)    History and Physical:  [] YES    []  NO  [] N/A  If yes, please list doctor or Urgent Care and date of H&P: appt with Dr John Hogue on 8/31/23 for medical clearance    Additional Clearance(Cardiac, Pulmonary, etc):  [] YES    [x]  NO    Pre-Admission Testing Visit:  [x] YES    []  NO If no, do labs/testing need to be done DOS?   [] YES    [x]  NO    Medication Reconciliation Complete:  [x] YES    []  NO        Additional Notes:    appt with Dr John Hogue on 8/31/23 for medical clearance    Speaks Upper sorbian      Interview Complete: [x] YES    []  NO          Kimo Esquivel RN  12:48 PM

## 2023-08-30 NOTE — PROGRESS NOTES
Jose Leon  :  8/10/23    DOS:  9/15/23    appt with Dr Nik King on 23 for medical clearance    UPDATE:-Cleared for surgery in [facility].       UPdate:  I called Martha Grullon in pre op pt is [place]

## 2023-09-12 ENCOUNTER — TELEPHONE (OUTPATIENT)
Dept: ORTHOPEDIC SURGERY | Age: 43
End: 2023-09-12

## 2023-09-12 NOTE — TELEPHONE ENCOUNTER
S/W Aixa Genao  Notified of surgery time for 9/15/2023:  Arrival Time: 6:00am  Surgery Time: 8:00am  Surgery Location: [address] [place] [address] [employer] for phone call.

## 2023-09-12 NOTE — CONSULTS
Session ID: [id number]  Language: [facility]   ID: #[id number]   Name: Mattie Leonard

## 2023-09-14 ENCOUNTER — ANESTHESIA EVENT (OUTPATIENT)
Dept: OPERATING ROOM | Age: 43
End: 2023-09-14
Payer: COMMERCIAL

## 2023-09-14 NOTE — DISCHARGE INSTRUCTIONS
DR. Preet Vo                  SHOULDER ARTHROSCOPY POSTOPERATIVE INSTRUCTIONS      ACTIVITIES:  Keep arm in sling after surgery. You may move your wrist and hand as much possible. Dr Perla Borja will tell you when you can stop wearing the sling. DRESSING: After surgery, a bulky dressing, and sometimes an ice cuff will be applied to your shoulder. It is normal to have a moderate amount of blood-tinted fluid drainage on the dressing. Keep the dressing clean and dry. ICE/COOLING: Apply ice to your shoulder if you did not purchase an ice cuff. If you did purchase a cooling cuff, follow the instructions included with the cuff to keep it cold. Wear the cuff continuously for 72 hours postoperatively. You can ice intermittently (such as 30 minutes on, 30 minutes off). Make sure the ice or cooling cuff is not directly in contact with your skin. Prolonged contact can result in frostbite. After 3 days, use after exercising, or to help with pain and swelling, for 30 minutes, 3 to 5 times a day. DRIVING:  You may drive once you are out of your sling, have stopped your pain medication, and can use your shoulder normally. This may be a decision to be made between you and your physical therapist. Lydia Merino must be able to control the steering wheel comfortably. You are the one ultimately responsible when behind the wheel. SHOWERING: You may begin to shower 3 days after your surgery. Keep the incisions covered. Dr Perla Borja will tell you if you need to wait longer. MEDICATION: Although you have a prescription for a strong pain medication, many patients are able to handle the discomfort postoperatively with a milder medication such as Extra-strength Tylenol. You may or may not (based on your surgery - Dr. Perla Borja will let you know) also have had a prescription for an anti-inflammatory such as Celebrex or Naprosyn, and an anti-nausea medication such as Phenergan.

## 2023-09-15 ENCOUNTER — HOSPITAL ENCOUNTER (OUTPATIENT)
Age: 43
Setting detail: OUTPATIENT SURGERY
Discharge: HOME | End: 2023-09-15
Attending: ORTHOPAEDIC SURGERY | Admitting: ORTHOPAEDIC SURGERY
Payer: COMMERCIAL

## 2023-09-15 ENCOUNTER — ANESTHESIA (OUTPATIENT)
Dept: OPERATING ROOM | Age: 43
End: 2023-09-15
Payer: COMMERCIAL

## 2023-09-15 VITALS
TEMPERATURE: 97.9 F | HEART RATE: 79 BPM | HEIGHT: 60 IN | DIASTOLIC BLOOD PRESSURE: 78 MMHG | SYSTOLIC BLOOD PRESSURE: 116 MMHG | BODY MASS INDEX: 24.54 KG/M2 | RESPIRATION RATE: 16 BRPM | WEIGHT: 125 LBS | OXYGEN SATURATION: 98 %

## 2023-09-15 DIAGNOSIS — M75.81 RIGHT ROTATOR CUFF TENDONITIS: Primary | ICD-10-CM

## 2023-09-15 LAB — HCG UR QL: NEGATIVE

## 2023-09-15 PROCEDURE — 6360000002 HC RX W HCPCS: Performed by: ORTHOPAEDIC SURGERY

## 2023-09-15 PROCEDURE — 2500000003 HC RX 250 WO HCPCS

## 2023-09-15 PROCEDURE — 2580000003 HC RX 258: Performed by: ORTHOPAEDIC SURGERY

## 2023-09-15 PROCEDURE — 7100000010 HC PHASE II RECOVERY - FIRST 15 MIN: Performed by: ORTHOPAEDIC SURGERY

## 2023-09-15 PROCEDURE — 3600000014 HC SURGERY LEVEL 4 ADDTL 15MIN: Performed by: ORTHOPAEDIC SURGERY

## 2023-09-15 PROCEDURE — 84703 CHORIONIC GONADOTROPIN ASSAY: CPT

## 2023-09-15 PROCEDURE — 7100000000 HC PACU RECOVERY - FIRST 15 MIN: Performed by: ORTHOPAEDIC SURGERY

## 2023-09-15 PROCEDURE — 3700000000 HC ANESTHESIA ATTENDED CARE: Performed by: ORTHOPAEDIC SURGERY

## 2023-09-15 PROCEDURE — 64415 NJX AA&/STRD BRCH PLXS IMG: CPT | Performed by: ANESTHESIOLOGY

## 2023-09-15 PROCEDURE — 7100000011 HC PHASE II RECOVERY - ADDTL 15 MIN: Performed by: ORTHOPAEDIC SURGERY

## 2023-09-15 PROCEDURE — 3600000004 HC SURGERY LEVEL 4 BASE: Performed by: ORTHOPAEDIC SURGERY

## 2023-09-15 PROCEDURE — 2580000003 HC RX 258: Performed by: STUDENT IN AN ORGANIZED HEALTH CARE EDUCATION/TRAINING PROGRAM

## 2023-09-15 PROCEDURE — A4217 STERILE WATER/SALINE, 500 ML: HCPCS | Performed by: ORTHOPAEDIC SURGERY

## 2023-09-15 PROCEDURE — 6370000000 HC RX 637 (ALT 250 FOR IP): Performed by: ORTHOPAEDIC SURGERY

## 2023-09-15 PROCEDURE — 3700000001 HC ADD 15 MINUTES (ANESTHESIA): Performed by: ORTHOPAEDIC SURGERY

## 2023-09-15 PROCEDURE — 6360000002 HC RX W HCPCS

## 2023-09-15 PROCEDURE — 2709999900 HC NON-CHARGEABLE SUPPLY: Performed by: ORTHOPAEDIC SURGERY

## 2023-09-15 PROCEDURE — 2720000010 HC SURG SUPPLY STERILE: Performed by: ORTHOPAEDIC SURGERY

## 2023-09-15 PROCEDURE — 7100000001 HC PACU RECOVERY - ADDTL 15 MIN: Performed by: ORTHOPAEDIC SURGERY

## 2023-09-15 RX ORDER — LIDOCAINE HYDROCHLORIDE 20 MG/ML
INJECTION, SOLUTION EPIDURAL; INFILTRATION; INTRACAUDAL; PERINEURAL PRN
Status: DISCONTINUED | OUTPATIENT
Start: 2023-09-15 | End: 2023-09-15 | Stop reason: SDUPTHER

## 2023-09-15 RX ORDER — ROCURONIUM BROMIDE 10 MG/ML
INJECTION, SOLUTION INTRAVENOUS PRN
Status: DISCONTINUED | OUTPATIENT
Start: 2023-09-15 | End: 2023-09-15 | Stop reason: SDUPTHER

## 2023-09-15 RX ORDER — DEXAMETHASONE SODIUM PHOSPHATE 4 MG/ML
INJECTION, SOLUTION INTRA-ARTICULAR; INTRALESIONAL; INTRAMUSCULAR; INTRAVENOUS; SOFT TISSUE PRN
Status: DISCONTINUED | OUTPATIENT
Start: 2023-09-15 | End: 2023-09-15 | Stop reason: SDUPTHER

## 2023-09-15 RX ORDER — ONDANSETRON 2 MG/ML
4 INJECTION INTRAMUSCULAR; INTRAVENOUS
Status: DISCONTINUED | OUTPATIENT
Start: 2023-09-15 | End: 2023-09-15 | Stop reason: HOSPADM

## 2023-09-15 RX ORDER — OXYCODONE HYDROCHLORIDE AND ACETAMINOPHEN 5; 325 MG/1; MG/1
1 TABLET ORAL EVERY 4 HOURS PRN
Qty: 40 TABLET | Refills: 0 | Status: SHIPPED | OUTPATIENT
Start: 2023-09-15 | End: 2023-09-22

## 2023-09-15 RX ORDER — FENTANYL CITRATE 50 UG/ML
INJECTION, SOLUTION INTRAMUSCULAR; INTRAVENOUS PRN
Status: DISCONTINUED | OUTPATIENT
Start: 2023-09-15 | End: 2023-09-15 | Stop reason: SDUPTHER

## 2023-09-15 RX ORDER — SODIUM CHLORIDE 0.9 % (FLUSH) 0.9 %
5-40 SYRINGE (ML) INJECTION EVERY 12 HOURS SCHEDULED
Status: DISCONTINUED | OUTPATIENT
Start: 2023-09-15 | End: 2023-09-15 | Stop reason: HOSPADM

## 2023-09-15 RX ORDER — FENTANYL CITRATE 0.05 MG/ML
25 INJECTION, SOLUTION INTRAMUSCULAR; INTRAVENOUS EVERY 5 MIN PRN
Status: DISCONTINUED | OUTPATIENT
Start: 2023-09-15 | End: 2023-09-15 | Stop reason: HOSPADM

## 2023-09-15 RX ORDER — ONDANSETRON 2 MG/ML
INJECTION INTRAMUSCULAR; INTRAVENOUS PRN
Status: DISCONTINUED | OUTPATIENT
Start: 2023-09-15 | End: 2023-09-15 | Stop reason: SDUPTHER

## 2023-09-15 RX ORDER — POVIDONE-IODINE 10 MG/G
OINTMENT TOPICAL
Status: COMPLETED | OUTPATIENT
Start: 2023-09-15 | End: 2023-09-15

## 2023-09-15 RX ORDER — MEPERIDINE HYDROCHLORIDE 25 MG/ML
12.5 INJECTION INTRAMUSCULAR; INTRAVENOUS; SUBCUTANEOUS
Status: DISCONTINUED | OUTPATIENT
Start: 2023-09-15 | End: 2023-09-15 | Stop reason: HOSPADM

## 2023-09-15 RX ORDER — SODIUM CHLORIDE 9 MG/ML
INJECTION, SOLUTION INTRAVENOUS PRN
Status: DISCONTINUED | OUTPATIENT
Start: 2023-09-15 | End: 2023-09-15 | Stop reason: HOSPADM

## 2023-09-15 RX ORDER — OXYCODONE HYDROCHLORIDE AND ACETAMINOPHEN 5; 325 MG/1; MG/1
1 TABLET ORAL
Status: DISCONTINUED | OUTPATIENT
Start: 2023-09-15 | End: 2023-09-15 | Stop reason: HOSPADM

## 2023-09-15 RX ORDER — MAGNESIUM HYDROXIDE 1200 MG/15ML
LIQUID ORAL CONTINUOUS PRN
Status: COMPLETED | OUTPATIENT
Start: 2023-09-15 | End: 2023-09-15

## 2023-09-15 RX ORDER — PROPOFOL 10 MG/ML
INJECTION, EMULSION INTRAVENOUS PRN
Status: DISCONTINUED | OUTPATIENT
Start: 2023-09-15 | End: 2023-09-15 | Stop reason: SDUPTHER

## 2023-09-15 RX ORDER — SODIUM CHLORIDE 0.9 % (FLUSH) 0.9 %
5-40 SYRINGE (ML) INJECTION PRN
Status: DISCONTINUED | OUTPATIENT
Start: 2023-09-15 | End: 2023-09-15 | Stop reason: HOSPADM

## 2023-09-15 RX ORDER — PROMETHAZINE HYDROCHLORIDE 25 MG/1
25 TABLET ORAL EVERY 6 HOURS PRN
Qty: 5 TABLET | Refills: 0 | Status: SHIPPED | OUTPATIENT
Start: 2023-09-15

## 2023-09-15 RX ORDER — MIDAZOLAM HYDROCHLORIDE 1 MG/ML
INJECTION INTRAMUSCULAR; INTRAVENOUS PRN
Status: DISCONTINUED | OUTPATIENT
Start: 2023-09-15 | End: 2023-09-15 | Stop reason: SDUPTHER

## 2023-09-15 RX ORDER — FENTANYL CITRATE 0.05 MG/ML
50 INJECTION, SOLUTION INTRAMUSCULAR; INTRAVENOUS EVERY 5 MIN PRN
Status: DISCONTINUED | OUTPATIENT
Start: 2023-09-15 | End: 2023-09-15 | Stop reason: HOSPADM

## 2023-09-15 RX ADMIN — ROCURONIUM BROMIDE 50 MG: 10 INJECTION INTRAVENOUS at 07:39

## 2023-09-15 RX ADMIN — CEFAZOLIN 2000 MG: 2 INJECTION, POWDER, FOR SOLUTION INTRAMUSCULAR; INTRAVENOUS at 07:43

## 2023-09-15 RX ADMIN — MIDAZOLAM 1 MG: 1 INJECTION INTRAMUSCULAR; INTRAVENOUS at 07:18

## 2023-09-15 RX ADMIN — LIDOCAINE HYDROCHLORIDE 80 MG: 20 INJECTION, SOLUTION EPIDURAL; INFILTRATION; INTRACAUDAL; PERINEURAL at 07:39

## 2023-09-15 RX ADMIN — SUGAMMADEX 200 MG: 100 INJECTION, SOLUTION INTRAVENOUS at 08:26

## 2023-09-15 RX ADMIN — DEXAMETHASONE SODIUM PHOSPHATE 10 MG: 4 INJECTION, SOLUTION INTRAMUSCULAR; INTRAVENOUS at 07:52

## 2023-09-15 RX ADMIN — MIDAZOLAM 1 MG: 1 INJECTION INTRAMUSCULAR; INTRAVENOUS at 07:33

## 2023-09-15 RX ADMIN — PROPOFOL 200 MG: 10 INJECTION, EMULSION INTRAVENOUS at 07:39

## 2023-09-15 RX ADMIN — SODIUM CHLORIDE: 9 INJECTION, SOLUTION INTRAVENOUS at 07:33

## 2023-09-15 RX ADMIN — FENTANYL CITRATE 50 MCG: 50 INJECTION INTRAMUSCULAR; INTRAVENOUS at 07:18

## 2023-09-15 RX ADMIN — ONDANSETRON 4 MG: 2 INJECTION INTRAMUSCULAR; INTRAVENOUS at 08:18

## 2023-09-15 ASSESSMENT — LIFESTYLE VARIABLES: SMOKING_STATUS: 0

## 2023-09-15 ASSESSMENT — PAIN - FUNCTIONAL ASSESSMENT: PAIN_FUNCTIONAL_ASSESSMENT: PREVENTS OR INTERFERES SOME ACTIVE ACTIVITIES AND ADLS

## 2023-09-15 ASSESSMENT — PAIN DESCRIPTION - LOCATION: LOCATION: ARM

## 2023-09-15 ASSESSMENT — PAIN SCALES - GENERAL: PAINLEVEL_OUTOF10: 4

## 2023-09-15 ASSESSMENT — PAIN DESCRIPTION - ORIENTATION: ORIENTATION: RIGHT

## 2023-09-15 ASSESSMENT — PAIN DESCRIPTION - FREQUENCY: FREQUENCY: INTERMITTENT

## 2023-09-15 ASSESSMENT — PAIN DESCRIPTION - PAIN TYPE: TYPE: ACUTE PAIN

## 2023-09-15 ASSESSMENT — PAIN DESCRIPTION - DESCRIPTORS: DESCRIPTORS: DISCOMFORT

## 2023-09-15 NOTE — BRIEF OP NOTE
Brief Postoperative Note      Patient:  May Burden  YOB: 1980  MRN: [id number]    Date of Procedure: 9/15/2023    Pre-Op Diagnosis Codes:     * Tendinitis of right rotator cuff [M75.81]     * Bursitis of right shoulder [M75.51]    Post-Op Diagnosis: Same       Procedure(s):  RIGHT SHOULDER ARTHROSCOPY, SUBACROMIAL DECOMPRESSION, ROTATOR CUFF DEBRIDEMENT    Surgeon(s):  Porfirio Larose MD    Assistant:  Surgical Assistant: Iva Chris RN    Anesthesia: General + block    Estimated Blood Loss (mL): Minimal    Complications: None          Electronically signed by Porfirio Larose MD on 9/15/2023 at 8:23 AM FAMILY HISTORY:  Family hx of lung cancer, MGM  FH: CAD (coronary artery disease), Father

## 2023-09-15 NOTE — CONSULTS
Session ID: [id number]  Language: [facility]   ID: #[id number]   Name: Kaci Becker

## 2023-09-15 NOTE — PROGRESS NOTES
Patient admitted to PACU # 7 from OR at 839   post RIGHT SHOULDER ARTHROSCOPY, SUBACROMIAL DECOMPRESSION, ROTATOR CUFF DEBRIDEMENT - Right per Dr Jojo Herrmann. Attached to PACU monitoring system and report received from anesthesia provider. Patient was reported to be hemodynamically stable during procedure.   Patient sleeping on admission

## 2023-09-15 NOTE — ANESTHESIA PROCEDURE NOTES
Peripheral Block    Patient location during procedure: pre-op  Reason for block: post-op pain management and at surgeon's request  Start time: 9/15/2023 7:20 AM  Staffing  Performed: anesthesiologist   Anesthesiologist: Yocasta Haynes MD  Performed by: Yocasta Haynes MD  Authorized by: Yocasta Haynes MD    Preanesthetic Checklist  Completed: patient identified, IV checked, site marked, risks and benefits discussed, surgical/procedural consents, equipment checked, pre-op evaluation, timeout performed, anesthesia consent given, oxygen available and monitors applied/VS acknowledged  Peripheral Block   Patient position: sitting  Prep: ChloraPrep  Provider prep: mask  Patient monitoring: continuous pulse ox and IV access  Block type: Brachial plexus  Interscalene  Laterality: right  Injection technique: single-shot  Guidance: ultrasound guided  Local infiltration: ropivacaine  Infiltration strength: 0.5 %  Local infiltration: ropivacaine  Dose: 3 mLDose: 30 mL    Needle   Needle type: combined needle/nerve stimulator   Needle gauge: 21 G  Needle localization: nerve stimulator  Needle length: 10 cm  Assessment   Injection assessment: negative aspiration for heme, no paresthesia on injection and local visualized surrounding nerve on ultrasound  Paresthesia pain: none  Slow fractionated injection: yes  Hemodynamics: stable  Real-time US image taken/store: yes    Additional Notes  Immediately prior to procedure a \"time out\" was called to verify the correct patient, allergies, laterality, procedure and equipment. Time out performed with Lucretia Rush RN    Local Anesthetic: 0.5 %  Ropivacaine   Amount: 30 ml  in 5 ml increments after negative aspiration each time. Anterior scalene and middle scalene muscles, upper, middle and lower trunks of the brachial plexus are identified, the tip of the needle and the spread of the local anesthetic around the brachial plexus are visualized.  The Brachial plexus appeared to be anatomically

## 2023-09-15 NOTE — OP NOTE
Aixa Genao (1980)    Date of Surgery- 9/15/2023      Preoperative Diagnosis:  Right shoulder tendinitis/low-grade partial tear, bursitis                                           Postoperative Diagnosis:  Right shoulder tendinitis/low-grade partial tear, bursitis    Procedure:  Right shoulder Diagnostic arthroscopy, subacromial decompression, rotator cuff cuff      Surgeon: Chip To. Perla Borja MD    Surgical Assistant: Jason Lockett    Anesthesia: General and interscalene nerve block    Estimated blood loss:  minimal    Complications: none    Disposition: To PACU in good condition      Indications for Procedure  Eric Higgins is a 37 y.o. female who was seen in the office for right shoulder pain. She had pain for several months without history of injury. She had an MRI which demonstrates shallow multifocal partial-thickness articular surface and interstitial tearing of the mid and posterior supraspinatus and anterior infraspinatus tendons. Mild labral degeneration and mild glenohumeral chondromalacia. She had physical therapy and corticosteroid injection, but failed to resolve her symptoms  We discussed operative and nonoperative treatment options and recommended surgical intervention. We discussed the risks, benefits, complications, and alternatives of the  procedure. The patient chose to proceed with the procedure. The patient subsequently provided written informed consent for the procedure. At no time were any guarantees implied or stated. Site Marking and Surgical Prep  The patient was seen in the preoperative holding area and the appropriate extremity marked. Interscalene block was administered by the anesthesia department. The patient was taken to the operating room, identified, and transferred onto the operating room table in the supine position. The patient was then induced under general anesthesia.   Patient received prophylactic preoperative IV antibiotics and had DVT prophylaxis with sequential compression devices on the bilateral lower extremities    Diagnostic Arthroscopy  The operative extremity was then sterilely prepped and draped in the standard fashion. The arm was placed in an arthroscopic arm mares. A timeout was performed, where the patient, the operative extremity, and operative procedure were once again verified. A standard posterior portal was then established. The arthroscope was then inserted into the glenohumeral joint. An anterior portal was then created under direct visualization using a spinal needle. Systematic arthroscopy was performed and the findings summarized below. 1.  Superior Labrum/Biceps tendon: Intact biceps tendon. Mild labral fraying. 2.  Anterior/Posterior Labrum- Intact without fraying  3. Rotator Cuff- The subscapularis tendon with mild fraying adjacent to the insertion. The supraspinatus tendon was intact  4. Inferior Axillary Recess-  No loose bodies  5. Articular Surfaces-grade I chondromalacia of the glenoid     Intra-articular Arthroscopy  Chondroplasty of the glenoid was performed and a shaver. Labral debridement was performed using the shaver. Biceps tendon was intact on probing. Biceps demonstrate no evidence of tendinitis or tendinopathy. There was some fraying adjacent to the subscapularis insertion, which was debrided using a shaver. Subscap tendon demonstrated full-thickness tear    Subacromial Decompression  The trocar and cannula were redirected to the subacromial space. The arthroscope and inflow were inserted. A lateral portal was created. Using a both a shaver and a radiofrequency probe, the bursa and the tissue beneath the acromion were removed. The coracromial ligament was reflected, but not resected. The patient had a mild anterior acromial spur. A bur was placed through the lateral portal and an anterior acromioplasty was performed. Closure  The shoulder was drained.   Arthroscopic equipment was removed and the portals

## 2023-09-15 NOTE — PROGRESS NOTES
Vss. Dressing remains the same. Fingers are warm, move well, arnoldo well. No c/o. Tolerated sitting up and po fluids and crackers well.  at bedside. Patient refused . Seems to understand english and is also speaking english. Verbalized understanding of discharge instructions.

## 2023-09-15 NOTE — PROGRESS NOTES
From PACU. Alert and oriented. no c/o. Vss. Dressing has a small amount red drainage. Fingers are warm, move well, arnoldo well. Sling in place.

## 2023-09-18 ENCOUNTER — OFFICE VISIT (OUTPATIENT)
Dept: ORTHOPEDIC SURGERY | Age: 43
End: 2023-09-18

## 2023-09-18 VITALS — RESPIRATION RATE: 16 BRPM | WEIGHT: 125 LBS | HEIGHT: 60 IN | BODY MASS INDEX: 24.54 KG/M2

## 2023-09-18 DIAGNOSIS — K59.03 DRUG-INDUCED CONSTIPATION: ICD-10-CM

## 2023-09-18 DIAGNOSIS — M75.81 RIGHT ROTATOR CUFF TENDONITIS: Primary | ICD-10-CM

## 2023-09-18 PROCEDURE — 99024 POSTOP FOLLOW-UP VISIT: CPT | Performed by: STUDENT IN AN ORGANIZED HEALTH CARE EDUCATION/TRAINING PROGRAM

## 2023-09-18 RX ORDER — DOCUSATE SODIUM 100 MG/1
100 CAPSULE, LIQUID FILLED ORAL 2 TIMES DAILY
Qty: 60 CAPSULE | Refills: 0 | Status: SHIPPED | OUTPATIENT
Start: 2023-09-18 | End: 2023-10-18

## 2023-09-20 ENCOUNTER — HOSPITAL ENCOUNTER (OUTPATIENT)
Dept: PHYSICAL THERAPY | Age: 43
Setting detail: THERAPIES SERIES
Discharge: HOME | End: 2023-09-20
Payer: COMMERCIAL

## 2023-09-20 DIAGNOSIS — M25.611 DECREASED SHOULDER MOBILITY, RIGHT: ICD-10-CM

## 2023-09-20 DIAGNOSIS — R29.898 WEAKNESS OF SHOULDER: Primary | ICD-10-CM

## 2023-09-20 DIAGNOSIS — M25.511 ACUTE PAIN OF RIGHT SHOULDER: ICD-10-CM

## 2023-09-20 PROCEDURE — 97140 MANUAL THERAPY 1/> REGIONS: CPT

## 2023-09-20 PROCEDURE — 97110 THERAPEUTIC EXERCISES: CPT

## 2023-09-20 PROCEDURE — 97161 PT EVAL LOW COMPLEX 20 MIN: CPT

## 2023-09-20 NOTE — FLOWSHEET NOTE
[facility] Therapy [address] [place] [address], [address] [facility] office: [phone] fax: [phone]      Physical Therapy: TREATMENT/PROGRESS NOTE   Patient: Aixa Genao (58 y.o. female)   Treatment Date: 2023   :  1980 MRN: [id number]   Visit #: 1    Insurance: Payor: Buck Monteiro / Plan: Francoise Patel / Product Type: *No Product type* /   Insurance ID: [id number] - (Medicaid Managed)  Secondary Insurance (if applicable):    Treatment Diagnosis:      ICD-10-CM    1. Weakness of shoulder  R29.898       2. Acute pain of right shoulder  M25.511       3. Decreased shoulder mobility, right  M25.611          Medical Diagnosis:       Right rotator cuff tendonitis [M75.81]   Referring Physician: MATEO Mcclure  PCP: Patricia Chino MD                             Plan of care signed (Y/N):     Date of Patient follow up with Physician:      Progress Report/POC: EVAL today    POC update due (10 Rx/or 30 days whichever is less [facility]): 10/20/23     Visit # Insurance Allowable Auth Needed   1 Eval only [x]Yes    []No     Latex Allergy:  [x]NO      []YES  Preferred Language for Healthcare:   [x]English       []other:    SUBJECTIVE EXAMINATION     Patient Report/Comments: Eval: : Pt recently seen in outpatient PT in the Spring of this year. Continued to have pain and followed up with ortho. MRI performed which showed small RTC tear. Pt underwent surgery on 9/15/23 for  Right shoulder Diagnostic arthroscopy, subacromial decompression, rotator cuff debridement. Pt states she has not been wearing her sling.       OBJECTIVE EXAMINATION     Observation:     Test measurements:   ROM:  Date      Shldr flexion    Shldr abd  Shldr IR         Shldr ER   A P A P A P A P   Eval   90  90  80  90                Strength:  Date Shoulder flexion Shoulder abduction Shoulder IR Shoulder  ER Bicep   Eval  NT NT NT NT NT                Test used Initial score

## 2023-09-25 NOTE — ANESTHESIA POSTPROCEDURE EVALUATION
Department of Anesthesiology  Postprocedure Note    Patient: Milly Swanson  MRN: [id number]  YOB: 1980  Date of evaluation: 9/25/2023      Procedure Summary     Date: 09/15/23 Room / Location: [facility]    Anesthesia Start: 6774 Anesthesia Stop: 6214    Procedure: RIGHT SHOULDER ARTHROSCOPY, SUBACROMIAL DECOMPRESSION, ROTATOR CUFF DEBRIDEMENT (Right: Shoulder) Diagnosis:       Tendinitis of right rotator cuff      Bursitis of right shoulder      (Tendinitis of right rotator cuff [M75.81])      (Bursitis of right shoulder [M75.51])    Surgeons: Sandro Bravo MD Responsible Provider: Mary Hummel MD    Anesthesia Type: general, regional ASA Status: 2          Anesthesia Type: No value filed.     Bill Phase I: Bill Score: 9    Bill Phase II: Bill Score: 10      Anesthesia Post Evaluation    Patient location during evaluation: PACU  Patient participation: complete - patient participated  Level of consciousness: awake and alert  Pain score: 1  Airway patency: patent  Nausea & Vomiting: no nausea and no vomiting  Complications: no  Cardiovascular status: blood pressure returned to baseline  Respiratory status: acceptable  Hydration status: euvolemic  Pain management: adequate

## 2023-09-27 ENCOUNTER — HOSPITAL ENCOUNTER (OUTPATIENT)
Dept: PHYSICAL THERAPY | Age: 43
Setting detail: THERAPIES SERIES
Discharge: HOME | End: 2023-09-27
Payer: COMMERCIAL

## 2023-09-27 PROCEDURE — 97110 THERAPEUTIC EXERCISES: CPT

## 2023-09-27 PROCEDURE — 97112 NEUROMUSCULAR REEDUCATION: CPT

## 2023-09-27 PROCEDURE — 97140 MANUAL THERAPY 1/> REGIONS: CPT

## 2023-09-27 NOTE — FLOWSHEET NOTE
slowed due to co-morbidities. [x] Plan just implemented, too soon (<30days) to assess goals progression   [] Goals require adjustment due to lack of progress  [] Patient is not progressing as expected and requires additional follow up with physician  [] Other:     [address]     Charges:  Timed Code Treatment Minutes: 45   Total Treatment Minutes:  45     Charge Justification:  (53517) THERAPEUTIC EXERCISE - Provided verbal/tactile cueing for activities related to strengthening, flexibility, endurance, ROM performed to prevent loss of range of motion, maintain or improve muscular strength or increase flexibility, following either an injury or surgery. (54181) NEUROMUSCULAR RE-EDUCATION- Therapeutic procedure, 1 or more areas, each 15 minutes; neuromuscular reeducation of movement, balance, coordination, kinesthetic sense, posture, and/or proprioception for sitting and/or standing activities  (71217) MANUAL THERAPY-  Manual therapy techniques, 1 or more regions, each 15 minutes (Mobilization/manipulation, manual lymphatic drainage, manual traction) for the purpose of modulating pain, promoting relaxation,  increasing ROM, reducing/eliminating soft tissue swelling/inflammation/restriction, improving soft tissue extensibility and allowing for proper ROM for normal function with self care, mobility, lifting and ambulation    TREATMENT PLAN   Plan: Cont POC- Continue emphasis/focus on improving proper muscle recruitment and activation/motor control patterns, modulating pain, and reduce/eliminate soft tissue swelling/inflammation/restriction. Next visit plan to continue max protect phase      Electronically Signed by Arun Carrasco PT  Date: 09/27/2023     Note: If patient does not return for scheduled/recommended follow up visits, this note will serve as a discharge from care along with the most recent update on progress.

## 2023-09-28 ENCOUNTER — TELEPHONE (OUTPATIENT)
Dept: ORTHOPEDIC SURGERY | Age: 43
End: 2023-09-28

## 2023-09-28 NOTE — TELEPHONE ENCOUNTER
General Question     Subject: [address]   Patient and /or Facility Request: Jade Keith  Contact Number:     LATONIA FROM [place] CALLED ABOUT THE AUTH FOR THE PT'S [facility].  THEY ARE MISSING EVERYTHING FOR THE PT PRIOR TO THE SX, OFFICE NOTES, ETC.  PLEASE CALL HER BACK -[phone], [address] [address]

## 2023-09-29 ENCOUNTER — HOSPITAL ENCOUNTER (OUTPATIENT)
Dept: PHYSICAL THERAPY | Age: 43
Setting detail: THERAPIES SERIES
Discharge: HOME | End: 2023-09-29
Payer: COMMERCIAL

## 2023-09-29 PROCEDURE — 97112 NEUROMUSCULAR REEDUCATION: CPT

## 2023-09-29 PROCEDURE — 97110 THERAPEUTIC EXERCISES: CPT

## 2023-09-29 PROCEDURE — 97140 MANUAL THERAPY 1/> REGIONS: CPT

## 2023-09-29 NOTE — FLOWSHEET NOTE
[facility] and Therapy [address] [place] [address], [address] [facility] office: [phone] fax: [phone]      Physical Therapy: TREATMENT/PROGRESS NOTE   Patient: Fabricio Hameed (82 y.o. female)   Treatment Date: 2023   :  1980 MRN: [id number]   Visit #: 3    Insurance: Payor: Pranav Pizarro / Plan: Brittanie Hatch / Product Type: *No Product type* /   Insurance ID: [id number] - (Medicaid Managed)  Secondary Insurance (if applicable):    Treatment Diagnosis:      ICD-10-CM     1. Weakness of shoulder  R29.898         2. Acute pain of right shoulder  M25.511         3. Decreased shoulder mobility, right  M25.611        Medical Diagnosis:       Right rotator cuff tendonitis [M75.81]   Referring Physician: MATEO Sutherland  PCP: Linda Pena MD                             Plan of care signed (Y/N):     Date of Patient follow up with Physician:      Progress Report/POC: NO    POC update due (10 Rx/or 30 days whichever is less [facility]): 10/20/23     Visit # Insurance Allowable Auth Needed   3 / 16 16 VISITS 23 TO 10/31/23 CODES 74038, 70267, 25312, 56545 [x]Yes    []No     Latex Allergy:  [x]NO      []YES  Preferred Language for Healthcare:   [x]English       []other:    SUBJECTIVE EXAMINATION     Patient Report/Comments: Eval: : Pt recently seen in outpatient PT in the Spring of this year. Continued to have pain and followed up with ortho. MRI performed which showed small RTC tear. Pt underwent surgery on 9/15/23 for  Right shoulder Diagnostic arthroscopy, subacromial decompression, rotator cuff debridement. Pt states she has not been wearing her sling.      : Pt to PT today without sling. Pt states she has not been wearing sling.   : Pt notes she gets a warm sensation sometimes in her triceps area. Pt to PT again today without sling.      OBJECTIVE EXAMINATION     Observation:     Test measurements:   ROM:  Date      Shldr flexion    [employer]

## 2023-10-02 ENCOUNTER — TELEPHONE (OUTPATIENT)
Dept: ORTHOPEDIC SURGERY | Age: 43
End: 2023-10-02

## 2023-10-02 NOTE — TELEPHONE ENCOUNTER
General Question     Subject: PA QUESTIONS  Patient and /or Facility Request: Fabricio Hameed  Contact Number: [phone]    LATONIA FROM [employer] NEEDS PHY THERAPY AND / OR HOME EXERCISES NOTES PT IS DOING SINCE SX AND THERE ARE CODES MISSING FROM [address] [place] [address] NOTES    PLEASE [address] [phone] QUESTIONS [phone]

## 2023-10-02 NOTE — TELEPHONE ENCOUNTER
Information sent to hospital authorization team as this was not for the clinical team due to retro-auth.

## 2023-10-04 ENCOUNTER — HOSPITAL ENCOUNTER (OUTPATIENT)
Dept: PHYSICAL THERAPY | Age: 43
Setting detail: THERAPIES SERIES
Discharge: HOME | End: 2023-10-04
Payer: COMMERCIAL

## 2023-10-04 ENCOUNTER — TELEPHONE (OUTPATIENT)
Dept: ORTHOPEDIC SURGERY | Age: 43
End: 2023-10-04

## 2023-10-04 PROCEDURE — 97140 MANUAL THERAPY 1/> REGIONS: CPT

## 2023-10-04 PROCEDURE — 97112 NEUROMUSCULAR REEDUCATION: CPT

## 2023-10-04 PROCEDURE — 97110 THERAPEUTIC EXERCISES: CPT

## 2023-10-04 NOTE — TELEPHONE ENCOUNTER
Pt has rescheduled her Post Op for Monday 10. 9.23 and asking is it ok for her stitches to wait that long ?  please call her back at [phone], that is her  cell phone

## 2023-10-04 NOTE — TELEPHONE ENCOUNTER
S/W [employer]CK[employer]AN  Sutures will be removed in PT on Friday, and she will f/u with [facility] on Monday in the office. Patient voiced understanding of the conversation and will contact the office with further questions or concerns.

## 2023-10-04 NOTE — FLOWSHEET NOTE
[address] [place] [address] and Therapy [address] [place] [address], [address] [facility] office: [phone] fax: [phone]      Physical Therapy: TREATMENT/PROGRESS NOTE   Patient: Arun Greenberg (51 y.o. female)   Treatment Date: 10/04/2023   :  1980 MRN: [id number]   Visit #: 4    Insurance: Payor: Chad Cockayne / Plan: Jackie Rain / Product Type: *No Product type* /   Insurance ID: [id number] - (Medicaid Managed)  Secondary Insurance (if applicable):    Treatment Diagnosis:      ICD-10-CM     1. Weakness of shoulder  R29.898         2. Acute pain of right shoulder  M25.511         3. Decreased shoulder mobility, right  M25.611        Medical Diagnosis:       Right rotator cuff tendonitis [M75.81]   Referring Physician: MATEO Coy  PCP: Monie Munoz MD                             Plan of care signed (Y/N):     Date of Patient follow up with Physician:      Progress Report/POC: NO    POC update due (10 Rx/or 30 days whichever is less [address] [place] [address]): 10/20/23     Visit # Insurance Allowable Auth Needed    16 VISITS 23 TO 10/31/23 CODES 38688, 61209, 89471, 54404 [x]Yes    []No     Latex Allergy:  [x]NO      []YES  Preferred Language for Healthcare:   [x]English       []other:    SUBJECTIVE EXAMINATION     Patient Report/Comments: Eval: : Pt recently seen in outpatient PT in the Spring of this year. Continued to have pain and followed up with ortho. MRI performed which showed small RTC tear. Pt underwent surgery on 9/15/23 for  Right shoulder Diagnostic arthroscopy, subacromial decompression, rotator cuff debridement. Pt states she has not been wearing her sling.      : Pt to PT today without sling. Pt states she has not been wearing sling.   : Pt notes she gets a warm sensation sometimes in her triceps area. Pt to PT again today without sling. 10/4: Pt reports her arm feels tired.   Pt continues to not wear sling and demonstrates actively lifting

## 2023-10-06 ENCOUNTER — HOSPITAL ENCOUNTER (OUTPATIENT)
Dept: PHYSICAL THERAPY | Age: 43
Setting detail: THERAPIES SERIES
Discharge: HOME | End: 2023-10-06
Payer: COMMERCIAL

## 2023-10-06 PROCEDURE — 97112 NEUROMUSCULAR REEDUCATION: CPT

## 2023-10-06 PROCEDURE — 97110 THERAPEUTIC EXERCISES: CPT

## 2023-10-06 PROCEDURE — 97140 MANUAL THERAPY 1/> REGIONS: CPT

## 2023-10-06 NOTE — FLOWSHEET NOTE
medical necessity for care and/or justification of therapy services: The patient has a musculoskeletal condition(s) with a corresponding ICD-10 code that is of complexity and severity that require skilled therapeutic intervention. This has a direct and significant impact on the need for therapy and significantly impacts the rate of recovery. Treatment/Activity Tolerance:  [x] Patient tolerated treatment well [] Patient limited by fatique  [] Patient limited by pain  [] Patient limited by other medical complications  [] Other:     Prognosis for POC: [x] Good [] Fair  [] Poor    Patient requires continued skilled intervention: [x] Yes  [] No      GOALS     Patient stated goal: \"Get back to work\"  Status: [] Progressing: [] Met: [] Not Met: [] Adjusted     Therapist goals for Patient:   Short Term Goals: To be achieved in: 2 weeks  Independent in HEP and progression per patient tolerance, in order to progress toward full function and prevent re-injury. Status: [] Progressing: [] Met: [] Not Met: [] Adjusted  Patient will have a decrease in pain by 40% to facilitate improvement in movement, function, and ADLs as indicated by functional deficits. Status: [] Progressing: [] Met: [] Not Met: [] Adjusted     Long Term Goals: To be achieved in: at discharge  Disability index score of 40% or less for the Upper Extremity Functional Scale to assist with return top prior level of function. Status: [] Progressing: [] Met: [] Not Met: [] Adjusted  Improve shoulder PROM to flexion and abuction 140 degrees or  better to allow for proper joint functioning as indicated by patients functional deficits.   Status: [] Progressing: [] Met: [] Not Met: [] Adjusted  Pt to improve strength to 4+/5 or better of rotator cuff, scapular retractors, and shoulder elevators to allow for proper muscle and joint use in functional mobility, ADLs and prior level of function   Status: [] Progressing: []

## 2023-10-09 ENCOUNTER — OFFICE VISIT (OUTPATIENT)
Dept: ORTHOPEDIC SURGERY | Age: 43
End: 2023-10-09

## 2023-10-09 VITALS — HEIGHT: 60 IN | WEIGHT: 125 LBS | BODY MASS INDEX: 24.54 KG/M2

## 2023-10-09 DIAGNOSIS — M75.81 RIGHT ROTATOR CUFF TENDONITIS: Primary | ICD-10-CM

## 2023-10-09 PROCEDURE — 99024 POSTOP FOLLOW-UP VISIT: CPT | Performed by: ORTHOPAEDIC SURGERY

## 2023-10-09 RX ORDER — OXYCODONE HYDROCHLORIDE AND ACETAMINOPHEN 5; 325 MG/1; MG/1
1 TABLET ORAL 2 TIMES DAILY PRN
Qty: 14 TABLET | Refills: 0 | Status: SHIPPED | OUTPATIENT
Start: 2023-10-09 | End: 2023-10-16

## 2023-10-11 ENCOUNTER — APPOINTMENT (OUTPATIENT)
Dept: PHYSICAL THERAPY | Age: 43
End: 2023-10-11
Payer: COMMERCIAL

## 2023-10-18 ENCOUNTER — HOSPITAL ENCOUNTER (OUTPATIENT)
Dept: PHYSICAL THERAPY | Age: 43
Setting detail: THERAPIES SERIES
Discharge: HOME | End: 2023-10-18
Payer: COMMERCIAL

## 2023-10-18 PROCEDURE — 97140 MANUAL THERAPY 1/> REGIONS: CPT

## 2023-10-18 PROCEDURE — 97112 NEUROMUSCULAR REEDUCATION: CPT

## 2023-10-18 PROCEDURE — 97110 THERAPEUTIC EXERCISES: CPT

## 2023-10-18 NOTE — FLOWSHEET NOTE
[address] [place] [address] and Therapy [address] [place] [address], [address] [facility] office: [phone] fax: [phone]      Physical Therapy: TREATMENT/PROGRESS NOTE   Patient: Aixa eGnao (89 y.o. female)   Treatment Date: 10/18/2023   :  1980 MRN: [id number]   Visit #:    Insurance: Payor: Buck Monteiro / Plan: Francoise Patel / Product Type: *No Product type* /   Insurance ID: [id number] - (Medicaid Managed)  Secondary Insurance (if applicable):    Treatment Diagnosis:      ICD-10-CM     1. Weakness of shoulder  R29.898         2. Acute pain of right shoulder  M25.511         3. Decreased shoulder mobility, right  M25.611        Medical Diagnosis:       Right rotator cuff tendonitis [M75.81]   Referring Physician: MATEO Mcclure  PCP: Patricia Chino MD                             Plan of care signed (Y/N): sent for cosign  (received)    Date of Patient follow up with Physician:      Progress Report/POC: YES and Date Range for this report: 23 to 10/20/23    POC update due (10 Rx/or 30 days whichever is less [address] [place] [address]): 23     Visit # Insurance Allowable Auth Needed    16 VISITS 23 TO 10/31/23 CODES 31893, 75604, 57496, 92507 [x]Yes    []No     Latex Allergy:  [x]NO      []YES  Preferred Language for Healthcare:   [x]English       []other:    SUBJECTIVE EXAMINATION     Patient Report/Comments: Eval: : Pt recently seen in outpatient PT in the Spring of this year. Continued to have pain and followed up with ortho. MRI performed which showed small RTC tear. Pt underwent surgery on 9/15/23 for  Right shoulder Diagnostic arthroscopy, subacromial decompression, rotator cuff debridement. Pt states she has not been wearing her sling.      : Pt to PT today without sling. Pt states she has not been wearing sling.   : Pt notes she gets a warm sensation sometimes in her triceps area. Pt to PT again today without sling.    10/4: Pt reports

## 2023-10-20 ENCOUNTER — HOSPITAL ENCOUNTER (OUTPATIENT)
Dept: PHYSICAL THERAPY | Age: 43
Setting detail: THERAPIES SERIES
Discharge: HOME | End: 2023-10-20
Payer: COMMERCIAL

## 2023-10-20 PROCEDURE — 97110 THERAPEUTIC EXERCISES: CPT

## 2023-10-20 PROCEDURE — 97112 NEUROMUSCULAR REEDUCATION: CPT

## 2023-10-20 PROCEDURE — 97140 MANUAL THERAPY 1/> REGIONS: CPT

## 2023-10-20 NOTE — FLOWSHEET NOTE
Unattended (94106)     [] Ther. Act (90741)    [] Orbie Sor. Traction (95967)     [] Gait (80462)    [] Dry Needle 1-2 muscle (25848)     [] Aquatic Therex (90135)    [] Dry Needle 3+ muscle (83281)     [] Iontophoresis (98322)    [] VASO (79174)     [] Ultrasound (65879)    [] Group Therapy (09583)     [] Estim Attended (48436)    [] Other: Total Timed Code Tx Minutes 43        Total Treatment Minutes 43         Charge Justification:  (29359) THERAPEUTIC EXERCISE - Provided verbal/tactile cueing for activities related to strengthening, flexibility, endurance, ROM performed to prevent loss of range of motion, maintain or improve muscular strength or increase flexibility, following either an injury or surgery. (92598) NEUROMUSCULAR RE-EDUCATION- Therapeutic procedure, 1 or more areas, each 15 minutes; neuromuscular reeducation of movement, balance, coordination, kinesthetic sense, posture, and/or proprioception for sitting and/or standing activities  (33362) MANUAL THERAPY-  Manual therapy techniques, 1 or more regions, each 15 minutes (Mobilization/manipulation, manual lymphatic drainage, manual traction) for the purpose of modulating pain, promoting relaxation,  increasing ROM, reducing/eliminating soft tissue swelling/inflammation/restriction, improving soft tissue extensibility and allowing for proper ROM for normal function with self care, mobility, lifting and ambulation    TREATMENT PLAN   Plan: Cont POC- Continue emphasis/focus on improving proper muscle recruitment and activation/motor control patterns, modulating pain, and reduce/eliminate soft tissue swelling/inflammation/restriction. Next visit plan to add isometrics. Electronically Signed by Sg Young PT  Date: 10/20/2023     Note: If patient does not return for scheduled/recommended follow up visits, this note will serve as a discharge from care along with the most recent update on progress.

## 2023-10-24 ENCOUNTER — OFFICE VISIT (OUTPATIENT)
Age: 43
End: 2023-10-24

## 2023-10-24 VITALS
DIASTOLIC BLOOD PRESSURE: 75 MMHG | HEIGHT: 60 IN | BODY MASS INDEX: 23.95 KG/M2 | SYSTOLIC BLOOD PRESSURE: 125 MMHG | HEART RATE: 81 BPM | WEIGHT: 122 LBS | TEMPERATURE: 98.3 F | OXYGEN SATURATION: 97 %

## 2023-10-24 DIAGNOSIS — U07.1 COVID-19: Primary | ICD-10-CM

## 2023-10-24 LAB
Lab: ABNORMAL
QC PASS/FAIL: ABNORMAL
SARS-COV-2 RDRP RESP QL NAA+PROBE: POSITIVE
STREPTOCOCCUS A RNA: NEGATIVE

## 2023-10-24 RX ORDER — BENZONATATE 200 MG/1
200 CAPSULE ORAL 3 TIMES DAILY PRN
Qty: 30 CAPSULE | Refills: 0 | Status: SHIPPED | OUTPATIENT
Start: 2023-10-24

## 2023-10-24 ASSESSMENT — ENCOUNTER SYMPTOMS
SINUS PAIN: 0
NAUSEA: 0
COUGH: 1
SHORTNESS OF BREATH: 0
DIARRHEA: 0
SORE THROAT: 1
SINUS PRESSURE: 0
RHINORRHEA: 1
VOMITING: 0

## 2023-10-25 ENCOUNTER — APPOINTMENT (OUTPATIENT)
Dept: PHYSICAL THERAPY | Age: 43
End: 2023-10-25
Payer: COMMERCIAL

## 2023-10-27 ENCOUNTER — APPOINTMENT (OUTPATIENT)
Dept: PHYSICAL THERAPY | Age: 43
End: 2023-10-27
Payer: COMMERCIAL

## 2023-11-01 ENCOUNTER — HOSPITAL ENCOUNTER (OUTPATIENT)
Dept: PHYSICAL THERAPY | Age: 43
Setting detail: THERAPIES SERIES
Discharge: HOME | End: 2023-11-01
Payer: COMMERCIAL

## 2023-11-01 PROCEDURE — 97140 MANUAL THERAPY 1/> REGIONS: CPT

## 2023-11-01 PROCEDURE — 97110 THERAPEUTIC EXERCISES: CPT

## 2023-11-01 PROCEDURE — 97112 NEUROMUSCULAR REEDUCATION: CPT

## 2023-11-01 NOTE — PLAN OF CARE
[]Hold PT, pending MD visit    Physical Therapy: TREATMENT/PROGRESS NOTE   Patient: Fermín Duran (81 y.o. female)   Treatment Date: 2023   :  1980 MRN: [id number]   Visit #:    Insurance: Payor: [employer] / Plan: Halley Mustafa / Product Type: *No Product type* /   Insurance ID: [id number] - (Medicaid Managed)  Secondary Insurance (if applicable):    Treatment Diagnosis:      ICD-10-CM     1. Weakness of shoulder  R29.898         2. Acute pain of right shoulder  M25.511         3. Decreased shoulder mobility, right  M25.611        Medical Diagnosis:       Right rotator cuff tendonitis [M75.81]   Referring Physician: MATEO Franco  PCP: Edvin Hayden MD                             Plan of care signed (Y/N): sent for cosign  (received)    Date of Patient follow up with Physician:      Progress Report/POC: YES and Date Range for this report: 23 to 2023    POC update due (10 Rx/or 30 days whichever is less [address] [place] [address]): 23     Visit # Insurance Allowable Auth Needed    16 VISITS 23 TO 10/31/23 CODES 02201, 14493, 61302, 92586 [x]Yes    []No     Latex Allergy:  [x]NO      []YES  Preferred Language for Healthcare:   [x]English       []other:    SUBJECTIVE EXAMINATION     Patient Report/Comments: Eval: : Pt recently seen in outpatient PT in the Spring of this year. Continued to have pain and followed up with ortho. MRI performed which showed small RTC tear. Pt underwent surgery on 9/15/23 for  Right shoulder Diagnostic arthroscopy, subacromial decompression, rotator cuff debridement. Pt states she has not been wearing her sling.      : Pt to PT today without sling. Pt states she has not been wearing sling.   : Pt notes she gets a warm sensation sometimes in her triceps area. Pt to PT again today without sling. 10/4: Pt reports her arm feels tired.   Pt continues to not wear sling and demonstrates actively lifting her arm at PT despite

## 2023-11-03 ENCOUNTER — HOSPITAL ENCOUNTER (OUTPATIENT)
Dept: PHYSICAL THERAPY | Age: 43
Setting detail: THERAPIES SERIES
Discharge: HOME | End: 2023-11-03
Payer: COMMERCIAL

## 2023-11-03 PROCEDURE — 97112 NEUROMUSCULAR REEDUCATION: CPT

## 2023-11-03 PROCEDURE — 97140 MANUAL THERAPY 1/> REGIONS: CPT

## 2023-11-03 PROCEDURE — 97110 THERAPEUTIC EXERCISES: CPT

## 2023-11-03 NOTE — FLOWSHEET NOTE
Patient limited by fatique  [] Patient limited by pain  [] Patient limited by other medical complications  [] Other:     Prognosis for POC: [x] Good [] Fair  [] Poor    Patient requires continued skilled intervention: [x] Yes  [] No      GOALS     Patient stated goal: \"Get back to work\"  Status: [x] Progressing: [] Met: [] Not Met: [] Adjusted     Therapist goals for Patient:   Short Term Goals: To be achieved in: 2 weeks  Independent in HEP and progression per patient tolerance, in order to progress toward full function and prevent re-injury. Status: [] Progressing: [x] Met: [] Not Met: [] Adjusted  Patient will have a decrease in pain by 40% to facilitate improvement in movement, function, and ADLs as indicated by functional deficits. Status: [] Progressing: [] Met: [x] Not Met: [] Adjusted     Long Term Goals: To be achieved in: at discharge  Disability index score of 40% or less for the Upper Extremity Functional Scale to assist with return top prior level of function. Status: [x] Progressing: [] Met: [] Not Met: [] Adjusted  Improve shoulder PROM to flexion and abuction 140 degrees or  better to allow for proper joint functioning as indicated by patients functional deficits. Status: [] Progressing: [x] Met: [] Not Met: [] Adjusted  Pt to improve strength to 4+/5 or better of rotator cuff, scapular retractors, and shoulder elevators to allow for proper muscle and joint use in functional mobility, ADLs and prior level of function   Status: [x] Progressing: [] Met: [] Not Met: [] Adjusted  Patient will return to Bathing/Grooming and Dressing without increased symptoms or restriction to work towards return to prior level of function. Status: [] Progressing: [x] Met: [] Not Met: [] Adjusted  Patient will be able to manage symptoms while resuming full duty at work.

## 2023-11-06 ENCOUNTER — OFFICE VISIT (OUTPATIENT)
Dept: ORTHOPEDIC SURGERY | Age: 43
End: 2023-11-06

## 2023-11-06 VITALS — HEIGHT: 60 IN | BODY MASS INDEX: 24.74 KG/M2 | RESPIRATION RATE: 16 BRPM | WEIGHT: 126 LBS

## 2023-11-06 DIAGNOSIS — M75.81 RIGHT ROTATOR CUFF TENDONITIS: Primary | ICD-10-CM

## 2023-11-06 DIAGNOSIS — M54.12 CERVICAL RADICULOPATHY: ICD-10-CM

## 2023-11-06 PROCEDURE — 99024 POSTOP FOLLOW-UP VISIT: CPT | Performed by: ORTHOPAEDIC SURGERY

## 2023-11-08 ENCOUNTER — HOSPITAL ENCOUNTER (OUTPATIENT)
Dept: PHYSICAL THERAPY | Age: 43
Setting detail: THERAPIES SERIES
Discharge: HOME | End: 2023-11-08
Payer: COMMERCIAL

## 2023-11-08 PROCEDURE — 97140 MANUAL THERAPY 1/> REGIONS: CPT

## 2023-11-08 PROCEDURE — 97110 THERAPEUTIC EXERCISES: CPT

## 2023-11-08 PROCEDURE — 97112 NEUROMUSCULAR REEDUCATION: CPT

## 2023-11-08 NOTE — FLOWSHEET NOTE
therapy and notes a difference with adding treatment to cspine. OBJECTIVE EXAMINATION     Observation: Updated 11/1/23  GIRD: L= T7, R=T12  Test measurements:   ROM:  Date      Shldr flexion    Shldr abd  Shldr IR         Shldr ER   A P A P A P A P   Eval 9/20  90  90  80  90   10/18/23 165 180 180 180  80  90   11/1/23 175  175   80  90     Strength:  Date Shoulder flexion Shoulder abduction Shoulder IR Shoulder  ER Bicep   Eval 9/20 NT NT NT NT NT   10/18/23 4-/5 4/5 4+/5 4/5 5/5   11/1/23 4/5 4/5 4+/5 4/5 5/5        Test used Initial score 11/08/2023   Pain Summary VAS 5-7/10 5/10   Functional questionnaire Upper Extremity functional Scale 10/80 = 87.5% disability 10/18/23: 43/80 = 46% disability       RESTRICTIONS/PRECAUTIONS: See MD protocol    Post-op Date Exercises ROM Restrictions   POD 1 = 9/15 Pendulum  Elbow/wrist/hand ROM Sling at all times   POD 14 = 9/29 PROM   Sling with activities  Flexion to 130, abd to 40, ER to tolerance w/45 degree abd   POD 21 = 10/6 Progress to AAROM Sling still with activities  Flexion to 150, abld to 70, ER to tolerance w/45 degree abd   POD 28 = 10/13  D/C sling   POD 42 = 10/27 Begin AROM, gentle strengthening, isometrics Flexion to 180, abd and ER to tolerance   10 WEEKS = 11/24 Initiate full strengthening program  Tband IR/ER, SL ER  Deltoid lateral raise  Elisabet-scapular strengthening  Progressive resistance      Exercises/Interventions:     Therapeutic Ex (56720)   Min:20 resistance Sets/time Reps Notes/Cues/Progressions          UBE f/b AROM X2 mins            Pulley flexion             IR  X2 mins     Elbow flexion/extension 5# 1 20           Tband tricep             Row             Ext             Serratus Green  Green  Green  Red 1  1  1  1 20 B  20 B  20 B  20 R    Standing flexion                 scaption 0#   0# 1  1 10 R  10 R        SL abd        ER AROM 1  1 15 R  15 R           Manual Intervention ([phone]):  Min 20  TIME            Shoulder PROM all

## 2023-11-10 ENCOUNTER — HOSPITAL ENCOUNTER (OUTPATIENT)
Dept: PHYSICAL THERAPY | Age: 43
Setting detail: THERAPIES SERIES
Discharge: HOME | End: 2023-11-10
Payer: COMMERCIAL

## 2023-11-10 PROCEDURE — 97140 MANUAL THERAPY 1/> REGIONS: CPT

## 2023-11-10 PROCEDURE — 97110 THERAPEUTIC EXERCISES: CPT

## 2023-11-10 PROCEDURE — 97112 NEUROMUSCULAR REEDUCATION: CPT

## 2023-11-10 NOTE — FLOWSHEET NOTE
improve strength to 4+/5 or better of rotator cuff, scapular retractors, and shoulder elevators to allow for proper muscle and joint use in functional mobility, ADLs and prior level of function   Status: [x] Progressing: [] Met: [] Not Met: [] Adjusted  Patient will return to Bathing/Grooming and Dressing without increased symptoms or restriction to work towards return to prior level of function. Status: [] Progressing: [x] Met: [] Not Met: [] Adjusted  Patient will be able to manage symptoms while resuming full duty at work. Status: [x] Progressing: [] Met: [] Not Met: [] Adjusted      Overall Progression Towards Functional goals/ Treatment Progress Update:  [] Patient is progressing as expected towards functional goals listed. [x] Progression is slowed due to complexities/Impairments listed. [] Progression has been slowed due to co-morbidities. [] Plan just implemented, too soon (<30days) to assess goals progression   [] Goals require adjustment due to lack of progress  [] Patient is not progressing as expected and requires additional follow up with physician  [] Other:     CHARGE CAPTURE     CPT Code (TIMED) minutes # CPT Code (UNTIMED) #     [x] Therex (04750)  20 1  [] EVAL:LOW (13703 - Typically 20 minutes face-to-face)     [x] Neuromusc. Re-ed (56438) 8 1  [] Re-Eval (08227)     [x] Manual (29035) 20 1  [] Estim Unattended (22 [id number])     [] Ther. Act (59772)    [] Lynnda Galas. Traction (87590)     [] Gait (12539)    [] Dry Needle 1-2 muscle (45043)     [] Aquatic Therex (66236)    [] Dry Needle 3+ muscle (28925)     [] Iontophoresis (79374)    [] VASO (51475)     [] Ultrasound (19045)    [] Group Therapy (29334)     [] Estim Attended (04565)    [] Other:     Total Timed Code Tx Minutes 48        Total Treatment Minutes 48         Charge Justification:  (69535)

## 2023-11-15 ENCOUNTER — HOSPITAL ENCOUNTER (OUTPATIENT)
Dept: PHYSICAL THERAPY | Age: 43
Setting detail: THERAPIES SERIES
Discharge: HOME | End: 2023-11-15
Payer: COMMERCIAL

## 2023-11-15 PROCEDURE — 97110 THERAPEUTIC EXERCISES: CPT

## 2023-11-15 PROCEDURE — 97112 NEUROMUSCULAR REEDUCATION: CPT

## 2023-11-15 PROCEDURE — 97140 MANUAL THERAPY 1/> REGIONS: CPT

## 2023-11-15 NOTE — FLOWSHEET NOTE
therapy and notes a difference with adding treatment to cspine. 11/10/23:  Pt notes she wakes up in the morning with pain in tricep area wrapping around to lateral arm. Pain subsides once she gets up and moves around. Pt notes her pain tends to return if she is seated working on something with her arms out for several hours (2-3) at one time. 11/15/23: Pt states that over the weekend after doing a lot of activity she had R neck pain with pain and N/T down into R hand. Notes N/T tends to be in digits 3-5 on R hand.     OBJECTIVE EXAMINATION     Observation: Updated 11/1/23  GIRD: L= T7, R=T12  Test measurements:   ROM:  Date      Shldr flexion    Shldr abd  Shldr IR         Shldr ER   A P A P A P A P   Eval 9/20  90  90  80  90   10/18/23 165 180 180 180  80  90   11/1/23 175  175   80  90     Strength:  Date Shoulder flexion Shoulder abduction Shoulder IR Shoulder  ER Bicep   Eval 9/20 NT NT NT NT NT   10/18/23 4-/5 4/5 4+/5 4/5 5/5   11/1/23 4/5 4/5 4+/5 4/5 5/5        Test used Initial score 11/15/2023   Pain Summary VAS 5-7/10 5/10   Functional questionnaire Upper Extremity functional Scale 10/80 = 87.5% disability 10/18/23: 43/80 = 46% disability       RESTRICTIONS/PRECAUTIONS: See MD protocol    Post-op Date Exercises ROM Restrictions   POD 1 = 9/15 Pendulum  Elbow/wrist/hand ROM Sling at all times   POD 14 = 9/29 PROM   Sling with activities  Flexion to 130, abd to 40, ER to tolerance w/45 degree abd   POD 21 = 10/6 Progress to AAROM Sling still with activities  Flexion to 150, abld to 70, ER to tolerance w/45 degree abd   POD 28 = 10/13  D/C sling   POD 42 = 10/27 Begin AROM, gentle strengthening, isometrics Flexion to 180, abd and ER to tolerance   10 WEEKS = 11/24 Initiate full strengthening program  Tband IR/ER, SL ER  Deltoid lateral raise  Elisabet-scapular strengthening  Progressive resistance      Exercises/Interventions:     Therapeutic Ex (33933)   Min:20 resistance Sets/time Reps

## 2023-11-17 ENCOUNTER — HOSPITAL ENCOUNTER (OUTPATIENT)
Dept: PHYSICAL THERAPY | Age: 43
Setting detail: THERAPIES SERIES
Discharge: HOME | End: 2023-11-17
Payer: COMMERCIAL

## 2023-11-17 PROCEDURE — 97140 MANUAL THERAPY 1/> REGIONS: CPT

## 2023-11-17 NOTE — FLOWSHEET NOTE
[address] [place] [address] and Therapy [address] [place] [address] office: [phone] fax: [phone]          Physical Therapy: TREATMENT/PROGRESS NOTE   Patient: Josue García (25 y.o. female)   Treatment Date: 2023   :  1980 MRN: [id number]   Visit #: 15  /16   Insurance: Payor: Clark Hobbs / Plan: Shayla Tolliver / Product Type: *No Product type* /   Insurance ID: [id number] - (Medicaid Managed)  Secondary Insurance (if applicable):    Treatment Diagnosis:      ICD-10-CM     1. Weakness of shoulder  R29.898         2. Acute pain of right shoulder  M25.511         3. Decreased shoulder mobility, right  M25.611        Medical Diagnosis:       Right rotator cuff tendonitis [M75.81]   Referring Physician: MATEO Urban  PCP: Cheryl Sloan MD                             Plan of care signed (Y/N): sent for cosign  (received)    Date of Patient follow up with Physician:      Progress Report/POC: YES and Date Range for this report: 23 to 2023    POC update due (10 Rx/or 30 days whichever is less [address]): 23     Visit # Insurance Allowable Auth Needed    16 VISITS 23 TO 10/31/23 CODES 82666, 52304, 64081, 76713 [x]Yes    []No     Latex Allergy:  [x]NO      []YES  Preferred Language for Healthcare:   [x]English       []other:    SUBJECTIVE EXAMINATION     Patient Report/Comments: Eval: : Pt recently seen in outpatient PT in the Spring of this year. Continued to have pain and followed up with ortho. MRI performed which showed small RTC tear. Pt underwent surgery on 9/15/23 for  Right shoulder Diagnostic arthroscopy, subacromial decompression, rotator cuff debridement. Pt states she has not been wearing her sling.      : Pt to PT today without sling. Pt states she has not been wearing sling.   : Pt notes she gets a warm sensation sometimes in her triceps area. Pt to PT again today without sling.    10/4: Pt

## 2023-11-29 ENCOUNTER — HOSPITAL ENCOUNTER (OUTPATIENT)
Dept: PHYSICAL THERAPY | Age: 43
Setting detail: THERAPIES SERIES
Discharge: HOME | End: 2023-11-29
Payer: COMMERCIAL

## 2023-11-29 PROCEDURE — 97140 MANUAL THERAPY 1/> REGIONS: CPT

## 2023-11-29 PROCEDURE — 97110 THERAPEUTIC EXERCISES: CPT

## 2023-11-29 PROCEDURE — 97112 NEUROMUSCULAR REEDUCATION: CPT

## 2023-11-29 NOTE — FLOWSHEET NOTE
movement, function, and ADLs as indicated by functional deficits. Status: [] Progressing: [] Met: [x] Not Met: [] Adjusted     Long Term Goals: To be achieved in: at discharge  Disability index score of 40% or less for the Upper Extremity Functional Scale to assist with return top prior level of function. Status: [x] Progressing: [] Met: [] Not Met: [] Adjusted  Improve shoulder PROM to flexion and abuction 140 degrees or  better to allow for proper joint functioning as indicated by patients functional deficits. Status: [] Progressing: [x] Met: [] Not Met: [] Adjusted  Pt to improve strength to 4+/5 or better of rotator cuff, scapular retractors, and shoulder elevators to allow for proper muscle and joint use in functional mobility, ADLs and prior level of function   Status: [x] Progressing: [] Met: [] Not Met: [] Adjusted  Patient will return to Bathing/Grooming and Dressing without increased symptoms or restriction to work towards return to prior level of function. Status: [] Progressing: [x] Met: [] Not Met: [] Adjusted  Patient will be able to manage symptoms while resuming full duty at work. Status: [x] Progressing: [] Met: [] Not Met: [] Adjusted      Overall Progression Towards Functional goals/ Treatment Progress Update:  [] Patient is progressing as expected towards functional goals listed. [x] Progression is slowed due to complexities/Impairments listed. [] Progression has been slowed due to co-morbidities.   [] Plan just implemented, too soon (<30days) to assess goals progression   [] Goals require adjustment due to lack of progress  [] Patient is not progressing as expected and requires additional follow up with physician  [] Other:     CHARGE CAPTURE     CPT Code (TIMED) minutes # CPT Code (UNTIMED) #     [x] Therex

## 2023-12-01 ENCOUNTER — HOSPITAL ENCOUNTER (OUTPATIENT)
Dept: PHYSICAL THERAPY | Age: 43
Setting detail: THERAPIES SERIES
Discharge: HOME | End: 2023-12-01

## 2023-12-01 NOTE — FLOWSHEET NOTE
[address] [address]  Phone: [phone]   Fax:     [phone]    Physical Therapy  Cancellation/No-show Note  Patient Name:  Kamaljit Loera  :  1980   Date:  2023  Cancelled visits to date: 1  No-shows to date: 0    Patient status for today's appointment patient:  [x]  Cancelled  []  Rescheduled appointment  []  No-show     Reason given by patient:  []  Patient ill  []  Conflicting appointment  []  No transportation    []  Conflict with work  []  No reason given  [x]  Other:     Comments:  Pt arrived for PT visit but stated she was not able to stay due to other commitments. Pt wishes to have MRI next week and follow up with MD about results before continuing PT. Pt to call PT department after MRI about need for additional therapy. If no contact from patient, we will d/c with HEP.      Phone call information:   []  Phone call made today to patient at _ time at number provided:      []  Patient answered, conversation as follows:    []  Patient did not answer, message left as follows:  []  Phone call not made today    Electronically signed by:  Jitendra Anderson, [address] [place] [address] , [facility],  [phone]

## 2023-12-07 ENCOUNTER — HOSPITAL ENCOUNTER (OUTPATIENT)
Dept: MRI IMAGING | Age: 43
Discharge: HOME | End: 2023-12-07
Attending: ORTHOPAEDIC SURGERY
Payer: COMMERCIAL

## 2023-12-07 DIAGNOSIS — M54.12 CERVICAL RADICULOPATHY: ICD-10-CM

## 2023-12-07 PROCEDURE — 72141 MRI NECK SPINE W/O DYE: CPT

## 2023-12-14 ENCOUNTER — TELEPHONE (OUTPATIENT)
Dept: ORTHOPEDIC SURGERY | Age: 43
End: 2023-12-14

## 2023-12-14 NOTE — TELEPHONE ENCOUNTER
S/W BUCKY  Per Dr. Jose Eduardo Ndiaye note, she is to follow up in the office after her MRI. Offered appointment for 12/19/2023 but they declined wanting a specific time which was not currently available until 1/8/2024. She has been scheduled for 1/8/2024 at 8:30am at [facility]. Patient voiced understanding of the conversation and will contact the office with further questions or concerns.

## 2023-12-14 NOTE — TELEPHONE ENCOUNTER
Test Results     Type of Test: MRI   Date of Test: 12/07  Location of Test: [facility]  Patient Contact Number: [phone]    Pt STATES HE HAS BEEN WAITING FOR A CALL BACK WITH TEST RESULTS AND WASN'T ADVISED TO MAKE AN APPT. PLEASE CALL TO ADVISE.

## 2024-01-02 ENCOUNTER — HOSPITAL ENCOUNTER (OUTPATIENT)
Dept: CT IMAGING | Age: 44
Discharge: HOME | End: 2024-01-02
Payer: COMMERCIAL

## 2024-01-02 DIAGNOSIS — R55 SYNCOPE, UNSPECIFIED SYNCOPE TYPE: ICD-10-CM

## 2024-01-02 PROCEDURE — 6360000004 HC RX CONTRAST MEDICATION: Performed by: STUDENT IN AN ORGANIZED HEALTH CARE EDUCATION/TRAINING PROGRAM

## 2024-01-02 PROCEDURE — 70496 CT ANGIOGRAPHY HEAD: CPT

## 2024-01-02 RX ADMIN — IOPAMIDOL 75 ML: 755 INJECTION, SOLUTION INTRAVENOUS at 07:46

## 2024-04-24 ENCOUNTER — OFFICE VISIT (OUTPATIENT)
Age: 44
End: 2024-04-24

## 2024-04-24 VITALS
BODY MASS INDEX: 25 KG/M2 | OXYGEN SATURATION: 99 % | SYSTOLIC BLOOD PRESSURE: 116 MMHG | DIASTOLIC BLOOD PRESSURE: 78 MMHG | WEIGHT: 128 LBS | TEMPERATURE: 99.1 F | HEART RATE: 69 BPM

## 2024-04-24 DIAGNOSIS — J01.10 ACUTE FRONTAL SINUSITIS, RECURRENCE NOT SPECIFIED: Primary | ICD-10-CM

## 2024-04-24 DIAGNOSIS — J02.9 SORE THROAT: ICD-10-CM

## 2024-04-24 LAB — S PYO AG THROAT QL: NORMAL

## 2024-04-24 RX ORDER — PREDNISONE 20 MG/1
20 TABLET ORAL DAILY
Qty: 5 TABLET | Refills: 0 | Status: SHIPPED | OUTPATIENT
Start: 2024-04-24 | End: 2024-04-29

## 2024-04-24 RX ORDER — BENZONATATE 100 MG/1
100 CAPSULE ORAL 3 TIMES DAILY PRN
Qty: 30 CAPSULE | Refills: 0 | Status: SHIPPED | OUTPATIENT
Start: 2024-04-24 | End: 2024-05-04

## 2024-04-24 ASSESSMENT — ENCOUNTER SYMPTOMS: COUGH: 1

## 2024-04-24 NOTE — PROGRESS NOTES
Hilaria Mata (:  1980) is a 43 y.o. female,Established patient, here for evaluation of the following chief complaint(s):  Cough, Pharyngitis, Otalgia, and Generalized Body Aches (Symptoms since Monday.)      ASSESSMENT/PLAN:    ICD-10-CM    1. Acute frontal sinusitis, recurrence not specified  J01.10 predniSONE (DELTASONE) 20 MG tablet     benzonatate (TESSALON) 100 MG capsule      2. Sore throat  J02.9 POCT rapid strep A        Results for POC orders placed in visit on 24   POCT rapid strep A   Result Value Ref Range    Strep A Ag None Detected None Detected        Dx Diff:strep, Allergic rhinitis   Education and handout provided on diagnosis and management of symptoms.   AVS reviewed with patient. Follow up as needed in UC or with PCP for new or worsening symptoms.   Return if symptoms worsen or fail to improve.    SUBJECTIVE/OBJECTIVE:  Patient presents today with complaints of cough and body aches that started Monday      History provided by:  Patient   used: No    Cough  Associated symptoms include ear pain.   Pharyngitis  Associated symptoms: cough and ear pain    Otalgia   Associated symptoms include coughing.   Generalized Body Aches  Associated symptoms: cough and ear pain        Vitals:    24 1912   BP: 116/78   Site: Right Upper Arm   Position: Sitting   Cuff Size: Medium Adult   Pulse: 69   Temp: 99.1 °F (37.3 °C)   TempSrc: Oral   SpO2: 99%   Weight: 58.1 kg (128 lb)       Review of Systems   HENT:  Positive for ear pain.    Respiratory:  Positive for cough.        Physical Exam  Constitutional:       Appearance: Normal appearance.   HENT:      Head: Normocephalic.      Right Ear: Tympanic membrane is bulging.      Left Ear: Tympanic membrane is bulging.      Nose: Nose normal.      Mouth/Throat:      Mouth: Mucous membranes are moist.      Pharynx: Oropharynx is clear. Posterior oropharyngeal erythema (copious PND) present.   Cardiovascular:      Rate and Rhythm:

## 2024-04-24 NOTE — PATIENT INSTRUCTIONS
Thank you for allowing us to care for you today and we hope you feel better soon  OTC allergy medicine is helpful but will not cure symptoms  New Prescriptions    BENZONATATE (TESSALON) 100 MG CAPSULE    Take 1 capsule by mouth 3 times daily as needed for Cough    PREDNISONE (DELTASONE) 20 MG TABLET    Take 1 tablet by mouth daily for 5 days

## 2024-06-02 ENCOUNTER — HOSPITAL ENCOUNTER (EMERGENCY)
Age: 44
Discharge: HOME | End: 2024-06-02
Payer: COMMERCIAL

## 2024-06-02 ENCOUNTER — APPOINTMENT (OUTPATIENT)
Dept: GENERAL RADIOLOGY | Age: 44
End: 2024-06-02
Payer: COMMERCIAL

## 2024-06-02 VITALS
WEIGHT: 125.88 LBS | TEMPERATURE: 97.8 F | HEART RATE: 74 BPM | RESPIRATION RATE: 17 BRPM | DIASTOLIC BLOOD PRESSURE: 76 MMHG | BODY MASS INDEX: 24.71 KG/M2 | HEIGHT: 60 IN | SYSTOLIC BLOOD PRESSURE: 120 MMHG | OXYGEN SATURATION: 99 %

## 2024-06-02 DIAGNOSIS — M79.644 THUMB PAIN, RIGHT: Primary | ICD-10-CM
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
## 2024-06-02 DIAGNOSIS — R21 RASH AND OTHER NONSPECIFIC SKIN ERUPTION: ICD-10-CM

## 2024-06-02 PROCEDURE — 6370000000 HC RX 637 (ALT 250 FOR IP): Performed by: NURSE PRACTITIONER

## 2024-06-02 PROCEDURE — 73140 X-RAY EXAM OF FINGER(S): CPT

## 2024-06-02 PROCEDURE — 99283 EMERGENCY DEPT VISIT LOW MDM: CPT

## 2024-06-02 RX ORDER — ACETAMINOPHEN 325 MG/1
650 TABLET ORAL ONCE
Status: COMPLETED | OUTPATIENT
Start: 2024-06-02 | End: 2024-06-02

## 2024-06-02 RX ORDER — ACETAMINOPHEN 500 MG
500 TABLET ORAL 4 TIMES DAILY PRN
Qty: 28 TABLET | Refills: 0 | Status: SHIPPED | OUTPATIENT
Start: 2024-06-02 | End: 2024-06-09

## 2024-06-02 RX ORDER — IBUPROFEN 600 MG/1
600 TABLET ORAL 3 TIMES DAILY PRN
Qty: 15 TABLET | Refills: 0 | Status: SHIPPED | OUTPATIENT
Start: 2024-06-02 | End: 2024-06-07

## 2024-06-02 RX ADMIN — IBUPROFEN 600 MG: 200 TABLET, FILM COATED ORAL at 10:42

## 2024-06-02 RX ADMIN — ACETAMINOPHEN 325MG 650 MG: 325 TABLET ORAL at 10:42

## 2024-06-02 ASSESSMENT — PAIN DESCRIPTION - LOCATION
LOCATION: FINGER (COMMENT WHICH ONE)
LOCATION: WRIST

## 2024-06-02 ASSESSMENT — PAIN DESCRIPTION - DESCRIPTORS
DESCRIPTORS: ACHING
DESCRIPTORS: ACHING

## 2024-06-02 ASSESSMENT — PAIN SCALES - GENERAL
PAINLEVEL_OUTOF10: 9
PAINLEVEL_OUTOF10: 9

## 2024-06-02 ASSESSMENT — PAIN - FUNCTIONAL ASSESSMENT: PAIN_FUNCTIONAL_ASSESSMENT: 0-10

## 2024-06-02 ASSESSMENT — LIFESTYLE VARIABLES
HOW OFTEN DO YOU HAVE A DRINK CONTAINING ALCOHOL: MONTHLY OR LESS
HOW MANY STANDARD DRINKS CONTAINING ALCOHOL DO YOU HAVE ON A TYPICAL DAY: 1 OR 2

## 2024-06-02 ASSESSMENT — PAIN DESCRIPTION - ORIENTATION
ORIENTATION: RIGHT
ORIENTATION: RIGHT

## 2024-06-02 NOTE — ED NOTES
Provider order placed for patient's discharge. Provider reviewed decision to discharge with the patient. Discharge paperwork and any prescriptions were reviewed with the patient. Patient verbalized understanding of discharge education and any prescriptions and has no further questions or further needs at this time. Patient left with all personal belongings and was stable upon departure. Patient thanked for choosing [facility] and informed to return should any need arise.     152

## 2024-06-02 NOTE — ED PROVIDER NOTES
[facility] EMERGENCY DEPARTMENT  EMERGENCY DEPARTMENT ENCOUNTER        Pt Name: Hilaria Mata  MRN: [id number]  Birthdate 1980  Date of evaluation: 6/2/2024  Provider: FRANCISCO Brewer - AMARI  PCP: Dionisio Herrera MD  Note Started: 1:14 PM EDT 6/2/24      ARUNA. I have evaluated this patient.        CHIEF COMPLAINT       Chief Complaint   Patient presents with    Hand Pain     Pt to ED from home c/o right hand pain that radiates around right thumb x10 days ago.       HISTORY OF PRESENT ILLNESS: 1 or more Elements     History from : Patient    Limitations to history : Language first language Lao.  Utilize the immitis  Meeta #[id number] to obtain history and physical.    Hilaria Mata is a 43 y.o. 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  Denies nausea, vomiting, fever, chills, sweats, loss sensation in her hand, change in color or temperature of fingers, or other symptoms/concerns.  Patient endorses that she has been experiencing pain for 10 days but at 0400 hrs. this a.m. pain woke her from sleep.  She therefore presents for evaluation.  Patient is left-hand dominant.    Nursing Notes were all reviewed and agreed with or any disagreements were addressed in the HPI.    REVIEW OF SYSTEMS :      Review of Systems   Constitutional: Negative.    HENT: Negative.     Eyes: Negative.    Respiratory: Negative.     Cardiovascular: Negative.    Gastrointestinal: Negative.    Endocrine: Negative.    Musculoskeletal:  Positive for arthralgias (Limited to the right hand and thumb). Negative for joint swelling, myalgias, neck pain and neck stiffness.   Skin: Negative.    Neurological: Negative.    Hematological: Negative.    Psychiatric/Behavioral: Negative.         Positives and Pertinent negatives as per HPI.

## 2024-06-02 NOTE — DISCHARGE INSTRUCTIONS
Return to the emergency department for new or worsening symptoms including, limited to, developing nausea, vomit, fever, chills, sweats, loss sensation in your vomit, change in the color or temperature of the, worsening pain not relieved by medications, other symptoms/concerns.    Medication as prescribed.  Eat before taking ibuprofen.    Utilize the thumb spica splint for your comfort.    Follow-up with the hand specialist-Dr. Armijo for symptoms that worsen or fail to improve in the next 1 week.

## 2024-06-05 ASSESSMENT — ENCOUNTER SYMPTOMS
EYES NEGATIVE: 1
GASTROINTESTINAL NEGATIVE: 1
RESPIRATORY NEGATIVE: 1

## 2024-06-10 ENCOUNTER — OFFICE VISIT (OUTPATIENT)
Dept: ORTHOPEDIC SURGERY | Age: 44
End: 2024-06-10
Payer: COMMERCIAL

## 2024-06-10 VITALS — BODY MASS INDEX: 24.54 KG/M2 | RESPIRATION RATE: 16 BRPM | WEIGHT: 125 LBS | HEIGHT: 60 IN

## 2024-06-10 DIAGNOSIS — M65.4 RADIAL STYLOID TENOSYNOVITIS: Primary | ICD-10-CM

## 2024-06-10 PROCEDURE — 1036F TOBACCO NON-USER: CPT | Performed by: PHYSICIAN ASSISTANT

## 2024-06-10 PROCEDURE — G8420 CALC BMI NORM PARAMETERS: HCPCS | Performed by: PHYSICIAN ASSISTANT

## 2024-06-10 PROCEDURE — 99203 OFFICE O/P NEW LOW 30 MIN: CPT | Performed by: PHYSICIAN ASSISTANT

## 2024-06-10 PROCEDURE — G8427 DOCREV CUR MEDS BY ELIG CLIN: HCPCS | Performed by: PHYSICIAN ASSISTANT

## 2024-06-10 PROCEDURE — L3923 HFO WITHOUT JOINTS PRE CST: HCPCS | Performed by: PHYSICIAN ASSISTANT

## 2024-06-10 NOTE — PROGRESS NOTES
Ms. Hilaria Mata is a 43 y.o. left handed   who is seen today in Hand Surgical Consultation at the request of Dionisio Herrera MD.    Due to language barrier, an  was present during the history-taking and subsequent discussion (and for part of the physical exam) with this patient.    She is seen today regarding a 2 week(s) history of right basilar thumb pain without history of previous injury.  She  was not seen for this concern by her primary care physician; previous treatment has included conservative measures.  She  reports mild Basilar Thumb pain and Pain with pinch & grasp located about the base of the right thumbs at the level of the CMC Joint, no tenderness of the remaining hand, wrist, or elbow on either side.  She notes today, no neurologic symptoms in the hands. Symptoms show no change over time.      I have today reviewed with Hilaria Mata the clinically relevant, past medical history, medications, allergies,  family history, social history, and Review Of Systems & I have documented any details relevant to today's presenting complaints in my history above.  Ms. Hilaria Mata's self-reported past medical history, medications, allergies,  family history, social history, and Review Of Systems have been scanned into the chart under the \"Media\" tab.    Physical Exam:  Ms. Hilaria Mata's most recent vitals:  Vitals  Respirations: 16  Height: 152.4 cm (5')  Weight - Scale: 56.7 kg (125 lb)    She is well nourished, oriented to person, place & time.  She demonstrates appropriate mood and affect as well as normal gait and station.    Skin: Normal in appearance, Normal Color, and Free of Lesions Bilateral   Finger range of motion is without significant limitation bilaterally.  Thumb range of motion is not decreased in flexion or extension at the basilar joint on the Right, normal on the Left   Wrist range of motion is Full bilaterally  There is no evidence of gross joint instability bilaterally.  Sensation

## 2024-10-29 ENCOUNTER — OFFICE VISIT (OUTPATIENT)
Dept: ORTHOPEDIC SURGERY | Age: 44
End: 2024-10-29
Payer: COMMERCIAL

## 2024-10-29 VITALS — HEIGHT: 60 IN | BODY MASS INDEX: 23.47 KG/M2

## 2024-10-29 DIAGNOSIS — R20.0 NUMBNESS OF RIGHT HAND: Primary | ICD-10-CM

## 2024-10-29 DIAGNOSIS — M54.12 CERVICAL RADICULAR PAIN: ICD-10-CM

## 2024-10-29 PROCEDURE — G8484 FLU IMMUNIZE NO ADMIN: HCPCS | Performed by: PHYSICIAN ASSISTANT

## 2024-10-29 PROCEDURE — G8427 DOCREV CUR MEDS BY ELIG CLIN: HCPCS | Performed by: PHYSICIAN ASSISTANT

## 2024-10-29 PROCEDURE — G8420 CALC BMI NORM PARAMETERS: HCPCS | Performed by: PHYSICIAN ASSISTANT

## 2024-10-29 PROCEDURE — 99213 OFFICE O/P EST LOW 20 MIN: CPT | Performed by: PHYSICIAN ASSISTANT

## 2024-10-29 PROCEDURE — 1036F TOBACCO NON-USER: CPT | Performed by: PHYSICIAN ASSISTANT

## 2024-10-29 NOTE — PROGRESS NOTES
Subjective:      Patient does not speak English therefore a  was utilized for today's visit.    Patient ID: Hilaria Mata is a 44 y.o. female who is here for follow up evaluation of her right lateral neck pain, right trapezial pain, right arm radicular pain with numbness/tingling right hand.  She underwent a rotator cuff repair by Dr. Lantigua on 9/15/2023.  She states her right shoulder is doing well.  She did report some improvement of her right sided neck pain with prior therapy, acupuncture treatment and medications.  Symptoms have returned..      Review Of Systems:   Significant for neck pain and negative for recent weight loss, fatigue, chills, visual disturbances, blood in stool or urine, recent infection.        Past Medical History:   Diagnosis Date    GERD (gastroesophageal reflux disease)     Headache     Wears glasses        Family History   Problem Relation Age of Onset    High Cholesterol Mother     Early Death Father         mva    Ovarian Cancer Maternal Aunt        Past Surgical History:   Procedure Laterality Date    CARPAL TUNNEL RELEASE Right      SECTION  ,,    SHOULDER ARTHROSCOPY Right 9/15/2023    RIGHT SHOULDER ARTHROSCOPY, SUBACROMIAL DECOMPRESSION, ROTATOR CUFF DEBRIDEMENT performed by Xu Lantigua MD at [facility] OR       Social History     Occupational History    Occupation: nail salon   Tobacco Use    Smoking status: Never    Smokeless tobacco: Never   Vaping Use    Vaping status: Never Used   Substance and Sexual Activity    Alcohol use: No     Alcohol/week: 0.0 standard drinks of alcohol    Drug use: Never    Sexual activity: Yes     Partners: Male       Current Outpatient Medications   Medication Sig Dispense Refill    vitamin D 25 MCG (1000 UT) CAPS Take 1 capsule by mouth daily 30 capsule 2    clotrimazole (LOTRIMIN) 1 % vaginal cream Place vaginally 2 times daily      omeprazole (PRILOSEC) 40 MG delayed release capsule Take 1 capsule by mouth daily

## 2024-11-19 NOTE — PROGRESS NOTES
[facility] PRE-OPERATIVE INSTRUCTIONS    Day of Procedure:   12/9             Arrival time:         10       Surgery time:1130    Take the following medications with a sip of water:  Follow your MD/Surgeons pre-procedure instructions regarding your medications     Do not eat or drink anything after 12:00 midnight prior to your surgery.  This includes water chewing gum, mints and ice chips.   You may brush your teeth and gargle the morning of your surgery, but do not swallow the water     Please see your family doctor/pediatrician for a history and physical and/or concerning medications.   Bring any test results/reports from your physicians office.   If you are under the care of a heart doctor or specialist doctor, please be aware that you may be asked to them for clearance    You may be asked to stop blood thinners such as Coumadin, Plavix, Fragmin, Lovenox, etc., or any anti-inflammatories such as:  Aspirin, Ibuprofen, Advil, Naproxen prior to your surgery.    We also ask that you stop any OTC medications such as fish oil, vitamin E, glucosamine, garlic, Multivitamins, COQ 10, etc.    We ask that you do not smoke 24 hours prior to surgery  We ask that you do not  drink any alcoholic beverages 24 hours prior to surgery     You must make arrangements for a responsible adult to take you home after your surgery.    For your safety you will not be allowed to leave alone or drive yourself home.  Your surgery will be cancelled if you do not have a ride home.     Also for your safety, you must have someone stay with you the first 24 hours after your surgery.     A parent or legal guardian must accompany a child scheduled for surgery and plan to stay at the hospital until the child is discharged.    Please do not bring other children with you.    For your comfort, please wear simple loose fitting clothing to the hospital.  Please do not bring valuables.    Do not wear any make-up or nail polish on your fingers

## 2024-11-21 ENCOUNTER — OFFICE VISIT (OUTPATIENT)
Dept: ORTHOPEDIC SURGERY | Age: 44
End: 2024-11-21

## 2024-11-21 VITALS — WEIGHT: 125 LBS | BODY MASS INDEX: 24.54 KG/M2 | HEIGHT: 60 IN

## 2024-11-21 DIAGNOSIS — M54.2 NECK PAIN: ICD-10-CM

## 2024-11-21 DIAGNOSIS — M79.18 MYOFASCIAL PAIN: Primary | ICD-10-CM

## 2024-11-21 RX ORDER — TRIAMCINOLONE ACETONIDE 40 MG/ML
40 INJECTION, SUSPENSION INTRA-ARTICULAR; INTRAMUSCULAR ONCE
Status: COMPLETED | OUTPATIENT
Start: 2024-11-21 | End: 2024-11-21

## 2024-11-21 RX ORDER — BUPIVACAINE HYDROCHLORIDE 2.5 MG/ML
2 INJECTION, SOLUTION INFILTRATION; PERINEURAL ONCE
Status: COMPLETED | OUTPATIENT
Start: 2024-11-21 | End: 2024-11-21

## 2024-11-21 RX ADMIN — BUPIVACAINE HYDROCHLORIDE 5 MG: 2.5 INJECTION, SOLUTION INFILTRATION; PERINEURAL at 09:00

## 2024-11-21 RX ADMIN — TRIAMCINOLONE ACETONIDE 40 MG: 40 INJECTION, SUSPENSION INTRA-ARTICULAR; INTRAMUSCULAR at 09:00

## 2024-11-21 NOTE — PROGRESS NOTES
attempt was made to check for errors.  It is possible that there are still dictated errors within this office note.  If so, please bring any significant errors to my attention for an addendum.  All efforts were made to ensure that this office note is accurate.

## 2024-12-06 ENCOUNTER — ANESTHESIA EVENT (OUTPATIENT)
Dept: ENDOSCOPY | Age: 44
End: 2024-12-06
Payer: COMMERCIAL

## 2024-12-07 NOTE — H&P
times daily. 9/30/24  Yes Dionisio Herrera MD   hydrocortisone 2.5 % cream Apply topically 2 times daily.  Patient taking differently: as needed Apply topically 2 times daily. 6/2/24  Yes Rita, Lynvone E, APRN - CNP   clotrimazole (LOTRIMIN) 1 % vaginal cream Place vaginally as needed 8/29/24   Provider, MD Santiago       Allergies:  Patient has no known allergies.    Social History:   TOBACCO:   reports that she has never smoked. She has never used smokeless tobacco.  ETOH:   reports no history of alcohol use.  DRUGS:   reports no history of drug use.    PHYSICAL EXAM:      Vital Signs: /64   Pulse 70   Temp 98.3 °F (36.8 °C) (Oral)   Resp 16   Ht 1.524 m (5')   Wt 52.6 kg (116 lb)   LMP 11/20/2024   SpO2 96%   BMI 22.65 kg/m²    Airway: No stridor or wheezing noted.  Good air movement  Pulmonary: without wheezes.  Clear to auscultation  Cardiac:regular rate and rhythm without loud murmurs  Abdomen:soft, nontender,  Bowel sounds present    Pre-Procedure Assessment / Plan:  1) EGD    ASA Grade:  ASA 2 - Patient with mild systemic disease with no functional limitations  Mallampati Classification:  Class III    Level of Sedation Plan:Deep sedation    Post Procedure plan: Return to same level of care    I assessed the patient and find that the patient is in satisfactory condition to proceed with the planned procedure and sedation plan.    I have explained the risk, benefits, and alternatives to the procedure; the patient understands and agrees to proceed.       Babar Rowley MD  12/9/2024

## 2024-12-07 NOTE — OP NOTE
Endoscopy Note    Patient: Hilaria Mata  : 1980  Acct#:     Procedure: Esophagogastroduodenoscopy with biopsy                         Date:  2024     Surgeon:   Babar Rowley MD    Referring Physician:  Dionisio Herrera MD    Indications: This is a 44 y.o. year old female who presents today with Epigastric pain     Postoperative Diagnosis:  1. Normal EGD-Biopsied for H. Pylori.     Anesthesia:  The patient was administered IV propofol per anesthesiology team.  Please see their operative records for full details.      Consent:  The patient or their legal guardian has signed an informed consent, and is aware of the potential risks, benefits, alternatives, and potential complications of this procedure.  These include, but are not limited to hemorrhage, bleeding, post procedural pain, perforation, phlebitis, aspiration, hypotension, hypoxia, cardiovascular events such as arryhthmia, and possibly death.     Description of Procedure: The patient was then taken to the endoscopy suite, placed in the left lateral decubitus position and the above IV sedation was administrered.    The Olympus video endoscope was placed through the patient's oropharynx without difficulty to the extent of the 2nd portion of the duodenum.  Both forward and retroflexed views of the stomach were obtained.      Findings:    Esophagus: The esophagus appeared normal without evidence of Fink's esophagus or reflux esophagitis.     Stomach: The stomach appeared normal on forward and retroflexed views. Biopsied for H. Pylori.     Duodenum: The first and 2nd portions of the duodenum appeared normal with normal villous pattern.    Estimated Blood Loss (mL): * No values recorded between  and 2024  8:19 AM *    Complications: None    Specimens: * No specimens in log *    The scope was then withdrawn back into the stomach, it was decompressed, and the scope was completely withdrawn.    The patient tolerated the procedure well

## 2024-12-09 ENCOUNTER — HOSPITAL ENCOUNTER (OUTPATIENT)
Age: 44
Setting detail: OUTPATIENT SURGERY
Discharge: HOME | End: 2024-12-09
Attending: INTERNAL MEDICINE | Admitting: INTERNAL MEDICINE
Payer: COMMERCIAL

## 2024-12-09 ENCOUNTER — ANESTHESIA (OUTPATIENT)
Dept: ENDOSCOPY | Age: 44
End: 2024-12-09
Payer: COMMERCIAL

## 2024-12-09 ENCOUNTER — APPOINTMENT (OUTPATIENT)
Dept: ENDOSCOPY | Age: 44
End: 2024-12-09
Attending: INTERNAL MEDICINE
Payer: COMMERCIAL

## 2024-12-09 VITALS
DIASTOLIC BLOOD PRESSURE: 77 MMHG | RESPIRATION RATE: 16 BRPM | TEMPERATURE: 96.9 F | HEIGHT: 60 IN | WEIGHT: 116 LBS | HEART RATE: 68 BPM | SYSTOLIC BLOOD PRESSURE: 116 MMHG | OXYGEN SATURATION: 98 % | BODY MASS INDEX: 22.78 KG/M2

## 2024-12-09 DIAGNOSIS — R13.10 DYSPHAGIA, UNSPECIFIED TYPE: ICD-10-CM

## 2024-12-09 LAB — HCG UR QL: NEGATIVE

## 2024-12-09 PROCEDURE — 2580000003 HC RX 258: Performed by: ANESTHESIOLOGY

## 2024-12-09 PROCEDURE — 6360000002 HC RX W HCPCS

## 2024-12-09 PROCEDURE — 88305 TISSUE EXAM BY PATHOLOGIST: CPT

## 2024-12-09 PROCEDURE — 7100000011 HC PHASE II RECOVERY - ADDTL 15 MIN: Performed by: INTERNAL MEDICINE

## 2024-12-09 PROCEDURE — 7100000000 HC PACU RECOVERY - FIRST 15 MIN: Performed by: INTERNAL MEDICINE

## 2024-12-09 PROCEDURE — 84703 CHORIONIC GONADOTROPIN ASSAY: CPT

## 2024-12-09 PROCEDURE — 3700000001 HC ADD 15 MINUTES (ANESTHESIA): Performed by: INTERNAL MEDICINE

## 2024-12-09 PROCEDURE — 7100000010 HC PHASE II RECOVERY - FIRST 15 MIN: Performed by: INTERNAL MEDICINE

## 2024-12-09 PROCEDURE — 3700000000 HC ANESTHESIA ATTENDED CARE: Performed by: INTERNAL MEDICINE

## 2024-12-09 PROCEDURE — 7100000001 HC PACU RECOVERY - ADDTL 15 MIN: Performed by: INTERNAL MEDICINE

## 2024-12-09 PROCEDURE — 3609012400 HC EGD TRANSORAL BIOPSY SINGLE/MULTIPLE: Performed by: INTERNAL MEDICINE

## 2024-12-09 PROCEDURE — 2709999900 HC NON-CHARGEABLE SUPPLY: Performed by: INTERNAL MEDICINE

## 2024-12-09 RX ORDER — NALOXONE HYDROCHLORIDE 0.4 MG/ML
INJECTION, SOLUTION INTRAMUSCULAR; INTRAVENOUS; SUBCUTANEOUS PRN
Status: DISCONTINUED | OUTPATIENT
Start: 2024-12-09 | End: 2024-12-09 | Stop reason: HOSPADM

## 2024-12-09 RX ORDER — SODIUM CHLORIDE 0.9 % (FLUSH) 0.9 %
5-40 SYRINGE (ML) INJECTION PRN
Status: DISCONTINUED | OUTPATIENT
Start: 2024-12-09 | End: 2024-12-09 | Stop reason: HOSPADM

## 2024-12-09 RX ORDER — SODIUM CHLORIDE 0.9 % (FLUSH) 0.9 %
5-40 SYRINGE (ML) INJECTION EVERY 12 HOURS SCHEDULED
Status: DISCONTINUED | OUTPATIENT
Start: 2024-12-09 | End: 2024-12-09 | Stop reason: HOSPADM

## 2024-12-09 RX ORDER — SODIUM CHLORIDE 0.9 % (FLUSH) 0.9 %
5-40 SYRINGE (ML) INJECTION PRN
Status: DISCONTINUED | OUTPATIENT
Start: 2024-12-09 | End: 2024-12-09

## 2024-12-09 RX ORDER — SODIUM CHLORIDE 0.9 % (FLUSH) 0.9 %
5-40 SYRINGE (ML) INJECTION EVERY 12 HOURS SCHEDULED
Status: DISCONTINUED | OUTPATIENT
Start: 2024-12-09 | End: 2024-12-09

## 2024-12-09 RX ORDER — SODIUM CHLORIDE 9 MG/ML
INJECTION, SOLUTION INTRAVENOUS PRN
Status: DISCONTINUED | OUTPATIENT
Start: 2024-12-09 | End: 2024-12-09

## 2024-12-09 RX ORDER — SODIUM CHLORIDE 9 MG/ML
INJECTION, SOLUTION INTRAVENOUS PRN
Status: DISCONTINUED | OUTPATIENT
Start: 2024-12-09 | End: 2024-12-09 | Stop reason: HOSPADM

## 2024-12-09 RX ORDER — ONDANSETRON 2 MG/ML
4 INJECTION INTRAMUSCULAR; INTRAVENOUS
Status: DISCONTINUED | OUTPATIENT
Start: 2024-12-09 | End: 2024-12-09 | Stop reason: HOSPADM

## 2024-12-09 RX ORDER — LIDOCAINE HYDROCHLORIDE 20 MG/ML
INJECTION, SOLUTION EPIDURAL; INFILTRATION; INTRACAUDAL; PERINEURAL
Status: DISCONTINUED | OUTPATIENT
Start: 2024-12-09 | End: 2024-12-09 | Stop reason: SDUPTHER

## 2024-12-09 RX ORDER — PROPOFOL 10 MG/ML
INJECTION, EMULSION INTRAVENOUS
Status: DISCONTINUED | OUTPATIENT
Start: 2024-12-09 | End: 2024-12-09 | Stop reason: SDUPTHER

## 2024-12-09 RX ADMIN — PROPOFOL 80 MG: 10 INJECTION, EMULSION INTRAVENOUS at 11:31

## 2024-12-09 RX ADMIN — PROPOFOL 200 MCG/KG/MIN: 10 INJECTION, EMULSION INTRAVENOUS at 11:32

## 2024-12-09 RX ADMIN — SODIUM CHLORIDE, PRESERVATIVE FREE 10 ML: 5 INJECTION INTRAVENOUS at 11:31

## 2024-12-09 RX ADMIN — SODIUM CHLORIDE, PRESERVATIVE FREE 10 ML: 5 INJECTION INTRAVENOUS at 11:36

## 2024-12-09 RX ADMIN — LIDOCAINE HYDROCHLORIDE 80 MG: 20 INJECTION, SOLUTION EPIDURAL; INFILTRATION; INTRACAUDAL; PERINEURAL at 11:31

## 2024-12-09 ASSESSMENT — PAIN SCALES - GENERAL: PAINLEVEL_OUTOF10: 0

## 2024-12-09 ASSESSMENT — ENCOUNTER SYMPTOMS: SHORTNESS OF BREATH: 0

## 2024-12-09 ASSESSMENT — PAIN - FUNCTIONAL ASSESSMENT
PAIN_FUNCTIONAL_ASSESSMENT: 0-10
PAIN_FUNCTIONAL_ASSESSMENT: 0-10
PAIN_FUNCTIONAL_ASSESSMENT: NONE - DENIES PAIN

## 2024-12-09 ASSESSMENT — PAIN DESCRIPTION - DESCRIPTORS: DESCRIPTORS: ACHING

## 2024-12-09 NOTE — PROGRESS NOTES
Pt to phase 2 from pacu. Pt a/o x4, denies pain or nausea at this time, vss. Abdo soft to touch, no pain with palpation. Pt ambulated to restroom with writer, gait steady. In chair at bedside, given po snack,  at bedside. Pt updated on plan of care, no signs of distress noted at this time, report obtained

## 2024-12-09 NOTE — ANESTHESIA POSTPROCEDURE EVALUATION
Department of Anesthesiology  Postprocedure Note    Patient: Hilaria Mata  MRN: [id number]  YOB: 1980  Date of evaluation: 12/9/2024    Procedure Summary       Date: 12/09/24 Room / Location: [place] [address] / [facility]    Anesthesia Start: 1122 Anesthesia Stop: 1139    Procedure: ESOPHAGOGASTRODUODENOSCOPY BIOPSY Diagnosis:       Dysphagia, unspecified type      (Dysphagia, unspecified type [R13.10])    Surgeons: Babar Rowley MD Responsible Provider: Jyoti Sanchez MD    Anesthesia Type: MAC ASA Status: 2            Anesthesia Type: MAC    Bill Phase I: Bill Score: 10    Bill Phase II: Bill Score: 10    Anesthesia Post Evaluation    Patient location during evaluation: PACU  Patient participation: complete - patient participated  Level of consciousness: awake and alert  Airway patency: patent  Nausea & Vomiting: no nausea and no vomiting  Cardiovascular status: hemodynamically stable  Respiratory status: acceptable  Hydration status: stable  Pain management: adequate    There were no known notable events for this encounter.

## 2024-12-09 NOTE — DISCHARGE INSTRUCTIONS
Recommendations:  Await pathology results   If biopsies positive for H. Pylori will treat with triple therapy. Patient had improvement in the past with Nortriptyline. We will restart now and monitor response.   Results will be posted to the [employer] patient portal in 7-10 days. If you dont have access call the office phone at [phone] for results.

## 2024-12-09 NOTE — ANESTHESIA PRE PROCEDURE
Department of Anesthesiology  Preprocedure Note       Name:  Hilaria Mata   Age:  44 y.o.  :  1980                                          MRN:  [id number]         Date:  2024      Surgeon: Surgeon(s):  Babar Rowley MD    Procedure: Procedure(s):  ESOPHAGOGASTRODUODENOSCOPY    Medications prior to admission:   Prior to Admission medications    Medication Sig Start Date End Date Taking? Authorizing Provider   omeprazole (PRILOSEC) 40 MG delayed release capsule TAKE 1 CAPSULE BY MOUTH DAILY  Patient taking differently: Take 1 capsule by mouth as needed 10/30/24  Yes Dionisio Herrera MD   vitamin D 25 MCG (1000 UT) CAPS Take 1 capsule by mouth daily 10/11/24  Yes Dionisio Herrera MD   acyclovir (ZOVIRAX) 400 MG tablet Take 1 tablet by mouth 3 times daily  Patient taking differently: Take 1 tablet by mouth as needed 24  Yes Dionisio Herrera MD   meclizine (ANTIVERT) 12.5 MG tablet Take 1 tablet by mouth 3 times daily as needed   Yes ProviderSantiago MD   tiZANidine (ZANAFLEX) 2 MG tablet TAKE 1 TABLET BY MOUTH EVERY 8 HOURS AS NEEDED FOR PAIN 24  Yes Dionisio Herrera MD   atovaquone-proguanil (MALARONE) 250-100 MG per tablet Take 1 tablet by mouth daily Take 1 tablet by mouth daily start 2 days prior to leaving and 7 days after returning  Patient taking differently: Take 1 tablet by mouth as needed Take 1 tablet by mouth daily start 2 days prior to leaving and 7 days after returning 24  Yes Dionisio Herrera MD   clobetasol (TEMOVATE) 0.05 % ointment Apply topically 2 times daily.  Patient taking differently: as needed Apply topically 2 times daily. 24  Yes Dionisio Herrera MD   hydrocortisone 2.5 % cream Apply topically 2 times daily.  Patient taking differently: as needed Apply topically 2 times daily. 24  Yes Rita, Lynvone E, APRN - CNP   clotrimazole (LOTRIMIN) 1 % vaginal cream Place vaginally as needed 24   ProviderSantiago MD       Current medications:

## 2024-12-09 NOTE — PROGRESS NOTES
Pt discharged to home. Transportation here with wheelchair. Accompanied by spouse Justo. Transported in personal vehicle. Discharge instructions and personal belongings given to pt. Explanation of discharge medications and instructions understood by verbal statement. Patient to pickup prescription at their pharmacy. No questions, comments or concerns at this time.

## 2025-04-30 ENCOUNTER — HOSPITAL ENCOUNTER (OUTPATIENT)
Dept: WOMENS IMAGING | Age: 45
Discharge: HOME | End: 2025-04-30
Attending: STUDENT IN AN ORGANIZED HEALTH CARE EDUCATION/TRAINING PROGRAM
Payer: COMMERCIAL

## 2025-04-30 VITALS — WEIGHT: 117 LBS | HEIGHT: 61 IN | BODY MASS INDEX: 22.09 KG/M2

## 2025-04-30 DIAGNOSIS — Z12.31 ENCOUNTER FOR SCREENING MAMMOGRAM FOR MALIGNANT NEOPLASM OF BREAST: ICD-10-CM

## 2025-04-30 PROCEDURE — 77063 BREAST TOMOSYNTHESIS BI: CPT

## 2025-08-12 ENCOUNTER — OFFICE VISIT (OUTPATIENT)
Dept: ORTHOPEDIC SURGERY | Age: 45
End: 2025-08-12

## 2025-08-12 VITALS — BODY MASS INDEX: 22.09 KG/M2 | HEIGHT: 61 IN | WEIGHT: 117 LBS

## 2025-08-12 DIAGNOSIS — S83.282A ACUTE LATERAL MENISCUS TEAR OF LEFT KNEE, INITIAL ENCOUNTER: ICD-10-CM

## 2025-08-12 DIAGNOSIS — M25.562 LEFT KNEE PAIN, UNSPECIFIED CHRONICITY: Primary | ICD-10-CM

## 2025-08-12 RX ORDER — BUPIVACAINE HYDROCHLORIDE 2.5 MG/ML
2 INJECTION, SOLUTION INFILTRATION; PERINEURAL ONCE
Status: COMPLETED | OUTPATIENT
Start: 2025-08-12 | End: 2025-08-12

## 2025-08-12 RX ORDER — TRIAMCINOLONE ACETONIDE 40 MG/ML
40 INJECTION, SUSPENSION INTRA-ARTICULAR; INTRAMUSCULAR ONCE
Status: COMPLETED | OUTPATIENT
Start: 2025-08-12 | End: 2025-08-12

## 2025-08-12 RX ADMIN — TRIAMCINOLONE ACETONIDE 40 MG: 40 INJECTION, SUSPENSION INTRA-ARTICULAR; INTRAMUSCULAR at 08:44

## 2025-08-12 RX ADMIN — BUPIVACAINE HYDROCHLORIDE 5 MG: 2.5 INJECTION, SOLUTION INFILTRATION; PERINEURAL at 08:44

## (undated) DEVICE — GLOVE ORANGE PI 7 1/2   MSG9075

## (undated) DEVICE — BLADE SHV L13CM DIA4MM DISECT AGG COOLCUT

## (undated) DEVICE — GOWN SIRUS NONREIN XL W/TWL: Brand: MEDLINE INDUSTRIES, INC.

## (undated) DEVICE — SOLUTION IRRIG 1000ML 0.9% SOD CHL USP POUR PLAS BTL

## (undated) DEVICE — 3M™ IOBAN™ 2 ANTIMICROBIAL INCISE DRAPE 6650EZ: Brand: IOBAN™ 2

## (undated) DEVICE — PACK,SHOULDER,DRAPE,POUCH: Brand: MEDLINE

## (undated) DEVICE — SOLUTION IRRIG 3000ML 0.9% SOD CHL USP UROMATIC PLAS CONT

## (undated) DEVICE — FORCEPS BX 240CM 2.4MM L NDL RAD JAW 4 M00513334

## (undated) DEVICE — SHOULDER CANNULA SET WITHOUT FENESTRATIONS, 5.5 MM (I.D.) X 70 MM: Brand: CONMED

## (undated) DEVICE — FORMALIN CLEAR VIAL 20 ML 10%

## (undated) DEVICE — SUTURE PROL SZ 0 L30IN NONABSORBABLE BLU MO-6 L26MM 1/2 CIR 8418H

## (undated) DEVICE — PROBE ABLAT XL 90DEG ASPIR BPLR RF 1 PC ELECTRD ERGO HNDL

## (undated) DEVICE — GARMENT COMPR L FOR 23IN CALF FLOTRN

## (undated) DEVICE — BITE BLOCK ENDOSCP AD 60 FR W/ ADJ STRP PLAS GRN BLOX

## (undated) DEVICE — 3M™ TEGADERM™ TRANSPARENT FILM DRESSING FRAME STYLE, 1626W, 4 IN X 4-3/4 IN (10 CM X 12 CM), 50/CT 4CT/CASE: Brand: 3M™ TEGADERM™

## (undated) DEVICE — SHOULDER ARTHROSCOPY: Brand: MEDLINE INDUSTRIES, INC.

## (undated) DEVICE — ENDOSCOPY KIT: Brand: MEDLINE INDUSTRIES, INC.

## (undated) DEVICE — MERCY HEALTH WEST TURNOVER: Brand: MEDLINE INDUSTRIES, INC.

## (undated) DEVICE — SUTURE PROL SZ 3-0 L18IN NONABSORBABLE BLU L30MM FS-1 3/8 8663G

## (undated) DEVICE — BUR SHV L13CM DIA4MM 8 FLUT OVL FOR RAP AGG BNE RESECT

## (undated) DEVICE — TUBING PMP L16FT MAIN DISP FOR AR-6400 AR-6475

## (undated) DEVICE — 3M™ COBAN™ NL STERILE NON-LATEX SELF-ADHERENT WRAP, 2084S, 4 IN X 5 YD (10 CM X 4,5 M), 18 ROLLS/CASE: Brand: 3M™ COBAN™